# Patient Record
Sex: MALE | Race: WHITE | NOT HISPANIC OR LATINO | Employment: FULL TIME | URBAN - METROPOLITAN AREA
[De-identification: names, ages, dates, MRNs, and addresses within clinical notes are randomized per-mention and may not be internally consistent; named-entity substitution may affect disease eponyms.]

---

## 2017-04-01 ENCOUNTER — HOSPITAL ENCOUNTER (OUTPATIENT)
Facility: HOSPITAL | Age: 52
Setting detail: OBSERVATION
Discharge: HOME | End: 2017-04-03
Attending: EMERGENCY MEDICINE | Admitting: INTERNAL MEDICINE

## 2017-04-01 DIAGNOSIS — F10.239 ALCOHOL WITHDRAWAL (HCC): Primary | ICD-10-CM

## 2017-04-01 DIAGNOSIS — E87.1 HYPONATREMIA: ICD-10-CM

## 2017-04-01 DIAGNOSIS — F19.11 HISTORY OF DRUG ABUSE (HCC): ICD-10-CM

## 2017-04-01 DIAGNOSIS — R25.1 TREMOR: ICD-10-CM

## 2017-04-01 LAB
ALBUMIN SERPL BCP-MCNC: 3.9 G/DL (ref 3.5–5)
ALP SERPL-CCNC: 65 U/L (ref 46–116)
ALT SERPL W P-5'-P-CCNC: 146 U/L (ref 12–78)
AMPHETAMINES SERPL QL SCN: NEGATIVE
ANION GAP SERPL CALCULATED.3IONS-SCNC: 12 MMOL/L (ref 4–13)
APTT PPP: 25 SECONDS (ref 24–36)
AST SERPL W P-5'-P-CCNC: 104 U/L (ref 5–45)
BARBITURATES UR QL: NEGATIVE
BASOPHILS # BLD AUTO: 0.04 THOUSANDS/ΜL (ref 0–0.1)
BASOPHILS NFR BLD AUTO: 1 % (ref 0–1)
BENZODIAZ UR QL: NEGATIVE
BILIRUB SERPL-MCNC: 0.59 MG/DL (ref 0.2–1)
BUN SERPL-MCNC: 10 MG/DL (ref 5–25)
CALCIUM SERPL-MCNC: 9.1 MG/DL (ref 8.3–10.1)
CHLORIDE SERPL-SCNC: 96 MMOL/L (ref 100–108)
CO2 SERPL-SCNC: 24 MMOL/L (ref 21–32)
COCAINE UR QL: POSITIVE
CREAT SERPL-MCNC: 0.78 MG/DL (ref 0.6–1.3)
EOSINOPHIL # BLD AUTO: 0.02 THOUSAND/ΜL (ref 0–0.61)
EOSINOPHIL NFR BLD AUTO: 0 % (ref 0–6)
ERYTHROCYTE [DISTWIDTH] IN BLOOD BY AUTOMATED COUNT: 14 % (ref 11.6–15.1)
ETHANOL EXG-MCNC: 0 MG/DL
GFR SERPL CREATININE-BSD FRML MDRD: >60 ML/MIN/1.73SQ M
GLUCOSE SERPL-MCNC: 81 MG/DL (ref 65–140)
HCT VFR BLD AUTO: 46.7 % (ref 36.5–49.3)
HGB BLD-MCNC: 16.7 G/DL (ref 12–17)
INR PPP: 0.91 (ref 0.86–1.16)
LYMPHOCYTES # BLD AUTO: 1.33 THOUSANDS/ΜL (ref 0.6–4.47)
LYMPHOCYTES NFR BLD AUTO: 18 % (ref 14–44)
MCH RBC QN AUTO: 34.2 PG (ref 26.8–34.3)
MCHC RBC AUTO-ENTMCNC: 35.8 G/DL (ref 31.4–37.4)
MCV RBC AUTO: 96 FL (ref 82–98)
METHADONE UR QL: NEGATIVE
MONOCYTES # BLD AUTO: 0.75 THOUSAND/ΜL (ref 0.17–1.22)
MONOCYTES NFR BLD AUTO: 10 % (ref 4–12)
NEUTROPHILS # BLD AUTO: 5.17 THOUSANDS/ΜL (ref 1.85–7.62)
NEUTS SEG NFR BLD AUTO: 71 % (ref 43–75)
NRBC BLD AUTO-RTO: 0 /100 WBCS
OPIATES UR QL SCN: NEGATIVE
PCP UR QL: NEGATIVE
PLATELET # BLD AUTO: 141 THOUSANDS/UL (ref 149–390)
PMV BLD AUTO: 10.8 FL (ref 8.9–12.7)
POTASSIUM SERPL-SCNC: 3.8 MMOL/L (ref 3.5–5.3)
PROT SERPL-MCNC: 8.6 G/DL (ref 6.4–8.2)
PROTHROMBIN TIME: 12.4 SECONDS (ref 12–14.3)
RBC # BLD AUTO: 4.88 MILLION/UL (ref 3.88–5.62)
SODIUM SERPL-SCNC: 132 MMOL/L (ref 136–145)
SPECIMEN SOURCE: NORMAL
THC UR QL: POSITIVE
TROPONIN I BLD-MCNC: 0.01 NG/ML (ref 0–0.08)
WBC # BLD AUTO: 7.32 THOUSAND/UL (ref 4.31–10.16)

## 2017-04-01 PROCEDURE — 82075 ASSAY OF BREATH ETHANOL: CPT | Performed by: EMERGENCY MEDICINE

## 2017-04-01 PROCEDURE — 84484 ASSAY OF TROPONIN QUANT: CPT

## 2017-04-01 PROCEDURE — 80053 COMPREHEN METABOLIC PANEL: CPT | Performed by: EMERGENCY MEDICINE

## 2017-04-01 PROCEDURE — 85025 COMPLETE CBC W/AUTO DIFF WBC: CPT | Performed by: EMERGENCY MEDICINE

## 2017-04-01 PROCEDURE — 96374 THER/PROPH/DIAG INJ IV PUSH: CPT

## 2017-04-01 PROCEDURE — 36415 COLL VENOUS BLD VENIPUNCTURE: CPT | Performed by: EMERGENCY MEDICINE

## 2017-04-01 PROCEDURE — 85610 PROTHROMBIN TIME: CPT | Performed by: EMERGENCY MEDICINE

## 2017-04-01 PROCEDURE — 96361 HYDRATE IV INFUSION ADD-ON: CPT

## 2017-04-01 PROCEDURE — 85730 THROMBOPLASTIN TIME PARTIAL: CPT | Performed by: EMERGENCY MEDICINE

## 2017-04-01 PROCEDURE — 80307 DRUG TEST PRSMV CHEM ANLYZR: CPT | Performed by: EMERGENCY MEDICINE

## 2017-04-01 PROCEDURE — 93005 ELECTROCARDIOGRAM TRACING: CPT | Performed by: EMERGENCY MEDICINE

## 2017-04-01 RX ORDER — LORAZEPAM 0.5 MG/1
1 TABLET ORAL ONCE
Status: COMPLETED | OUTPATIENT
Start: 2017-04-01 | End: 2017-04-01

## 2017-04-01 RX ORDER — LORAZEPAM 2 MG/ML
1 INJECTION INTRAMUSCULAR ONCE
Status: COMPLETED | OUTPATIENT
Start: 2017-04-01 | End: 2017-04-01

## 2017-04-01 RX ADMIN — LORAZEPAM 1 MG: 2 INJECTION, SOLUTION INTRAMUSCULAR; INTRAVENOUS at 21:40

## 2017-04-01 RX ADMIN — SODIUM CHLORIDE 1000 ML: 0.9 INJECTION, SOLUTION INTRAVENOUS at 21:39

## 2017-04-01 RX ADMIN — LORAZEPAM 1 MG: 0.5 TABLET ORAL at 21:04

## 2017-04-02 PROBLEM — R74.01 TRANSAMINITIS: Status: ACTIVE | Noted: 2017-04-02

## 2017-04-02 PROBLEM — E87.1 HYPONATREMIA: Status: ACTIVE | Noted: 2017-04-02

## 2017-04-02 PROBLEM — F10.239 ALCOHOL WITHDRAWAL (HCC): Status: ACTIVE | Noted: 2017-04-02

## 2017-04-02 PROBLEM — F10.939 ALCOHOL WITHDRAWAL (HCC): Status: ACTIVE | Noted: 2017-04-02

## 2017-04-02 PROBLEM — F17.200 NICOTINE DEPENDENCE: Status: ACTIVE | Noted: 2017-04-02

## 2017-04-02 PROBLEM — F10.10 ALCOHOL ABUSE: Status: ACTIVE | Noted: 2017-04-02

## 2017-04-02 PROBLEM — F14.10 COCAINE ABUSE (HCC): Status: ACTIVE | Noted: 2017-04-02

## 2017-04-02 PROBLEM — F12.10 MARIJUANA ABUSE: Status: ACTIVE | Noted: 2017-04-02

## 2017-04-02 LAB
ANION GAP SERPL CALCULATED.3IONS-SCNC: 7 MMOL/L (ref 4–13)
APAP SERPL-MCNC: <2 UG/ML (ref 10–30)
ATRIAL RATE: 77 BPM
BUN SERPL-MCNC: 13 MG/DL (ref 5–25)
CALCIUM SERPL-MCNC: 8.8 MG/DL (ref 8.3–10.1)
CHLORIDE SERPL-SCNC: 102 MMOL/L (ref 100–108)
CO2 SERPL-SCNC: 26 MMOL/L (ref 21–32)
CREAT SERPL-MCNC: 0.81 MG/DL (ref 0.6–1.3)
GFR SERPL CREATININE-BSD FRML MDRD: >60 ML/MIN/1.73SQ M
GLUCOSE SERPL-MCNC: 67 MG/DL (ref 65–140)
MAGNESIUM SERPL-MCNC: 2.3 MG/DL (ref 1.6–2.6)
P AXIS: 62 DEGREES
POTASSIUM SERPL-SCNC: 3.6 MMOL/L (ref 3.5–5.3)
PR INTERVAL: 138 MS
QRS AXIS: 67 DEGREES
QRSD INTERVAL: 90 MS
QT INTERVAL: 412 MS
QTC INTERVAL: 466 MS
SODIUM SERPL-SCNC: 135 MMOL/L (ref 136–145)
T WAVE AXIS: 39 DEGREES
TSH SERPL DL<=0.05 MIU/L-ACNC: 3.44 UIU/ML (ref 0.36–3.74)
VENTRICULAR RATE: 77 BPM

## 2017-04-02 PROCEDURE — 84443 ASSAY THYROID STIM HORMONE: CPT | Performed by: INTERNAL MEDICINE

## 2017-04-02 PROCEDURE — 83735 ASSAY OF MAGNESIUM: CPT | Performed by: INTERNAL MEDICINE

## 2017-04-02 PROCEDURE — 80329 ANALGESICS NON-OPIOID 1 OR 2: CPT | Performed by: INTERNAL MEDICINE

## 2017-04-02 PROCEDURE — 99285 EMERGENCY DEPT VISIT HI MDM: CPT

## 2017-04-02 PROCEDURE — 80048 BASIC METABOLIC PNL TOTAL CA: CPT | Performed by: INTERNAL MEDICINE

## 2017-04-02 RX ORDER — THIAMINE MONONITRATE (VIT B1) 100 MG
100 TABLET ORAL DAILY
Status: DISCONTINUED | OUTPATIENT
Start: 2017-04-02 | End: 2017-04-03 | Stop reason: HOSPADM

## 2017-04-02 RX ORDER — SODIUM CHLORIDE 9 MG/ML
75 INJECTION, SOLUTION INTRAVENOUS CONTINUOUS
Status: DISCONTINUED | OUTPATIENT
Start: 2017-04-02 | End: 2017-04-03

## 2017-04-02 RX ORDER — ONDANSETRON 2 MG/ML
4 INJECTION INTRAMUSCULAR; INTRAVENOUS EVERY 6 HOURS PRN
Status: DISCONTINUED | OUTPATIENT
Start: 2017-04-02 | End: 2017-04-03 | Stop reason: HOSPADM

## 2017-04-02 RX ORDER — NICOTINE 21 MG/24HR
1 PATCH, TRANSDERMAL 24 HOURS TRANSDERMAL DAILY
Status: DISCONTINUED | OUTPATIENT
Start: 2017-04-02 | End: 2017-04-03 | Stop reason: HOSPADM

## 2017-04-02 RX ORDER — LORAZEPAM 2 MG/ML
1 INJECTION INTRAMUSCULAR EVERY 4 HOURS PRN
Status: DISCONTINUED | OUTPATIENT
Start: 2017-04-02 | End: 2017-04-03 | Stop reason: HOSPADM

## 2017-04-02 RX ORDER — HEPARIN SODIUM 5000 [USP'U]/ML
5000 INJECTION, SOLUTION INTRAVENOUS; SUBCUTANEOUS EVERY 8 HOURS SCHEDULED
Status: DISCONTINUED | OUTPATIENT
Start: 2017-04-02 | End: 2017-04-02

## 2017-04-02 RX ORDER — FOLIC ACID 1 MG/1
1 TABLET ORAL DAILY
Status: DISCONTINUED | OUTPATIENT
Start: 2017-04-02 | End: 2017-04-03 | Stop reason: HOSPADM

## 2017-04-02 RX ADMIN — NICOTINE 1 PATCH: 21 PATCH, EXTENDED RELEASE TRANSDERMAL at 09:20

## 2017-04-02 RX ADMIN — SODIUM CHLORIDE 75 ML/HR: 0.9 INJECTION, SOLUTION INTRAVENOUS at 02:52

## 2017-04-02 RX ADMIN — ENOXAPARIN SODIUM 40 MG: 40 INJECTION SUBCUTANEOUS at 09:22

## 2017-04-02 RX ADMIN — Medication 100 MG: at 02:52

## 2017-04-02 RX ADMIN — LORAZEPAM 1 MG: 2 INJECTION, SOLUTION INTRAMUSCULAR; INTRAVENOUS at 14:27

## 2017-04-02 RX ADMIN — LORAZEPAM 1 MG: 2 INJECTION, SOLUTION INTRAMUSCULAR; INTRAVENOUS at 18:34

## 2017-04-02 RX ADMIN — LORAZEPAM 1 MG: 2 INJECTION, SOLUTION INTRAMUSCULAR; INTRAVENOUS at 09:18

## 2017-04-02 RX ADMIN — LORAZEPAM 1 MG: 2 INJECTION, SOLUTION INTRAMUSCULAR; INTRAVENOUS at 02:52

## 2017-04-02 RX ADMIN — FOLIC ACID 1 MG: 1 TABLET ORAL at 02:52

## 2017-04-02 RX ADMIN — SODIUM CHLORIDE 75 ML/HR: 0.9 INJECTION, SOLUTION INTRAVENOUS at 14:24

## 2017-04-03 VITALS
RESPIRATION RATE: 20 BRPM | DIASTOLIC BLOOD PRESSURE: 85 MMHG | BODY MASS INDEX: 23.18 KG/M2 | TEMPERATURE: 97.9 F | OXYGEN SATURATION: 92 % | WEIGHT: 156.53 LBS | HEIGHT: 69 IN | SYSTOLIC BLOOD PRESSURE: 150 MMHG | HEART RATE: 100 BPM

## 2017-04-03 PROBLEM — E87.1 HYPONATREMIA: Status: RESOLVED | Noted: 2017-04-02 | Resolved: 2017-04-03

## 2017-04-03 LAB
ANION GAP SERPL CALCULATED.3IONS-SCNC: 8 MMOL/L (ref 4–13)
BUN SERPL-MCNC: 12 MG/DL (ref 5–25)
CALCIUM SERPL-MCNC: 8.7 MG/DL (ref 8.3–10.1)
CHLORIDE SERPL-SCNC: 106 MMOL/L (ref 100–108)
CO2 SERPL-SCNC: 24 MMOL/L (ref 21–32)
CREAT SERPL-MCNC: 0.78 MG/DL (ref 0.6–1.3)
ERYTHROCYTE [DISTWIDTH] IN BLOOD BY AUTOMATED COUNT: 13.7 % (ref 11.6–15.1)
GFR SERPL CREATININE-BSD FRML MDRD: >60 ML/MIN/1.73SQ M
GLUCOSE SERPL-MCNC: 86 MG/DL (ref 65–140)
HCT VFR BLD AUTO: 47.3 % (ref 36.5–49.3)
HGB BLD-MCNC: 16.4 G/DL (ref 12–17)
MCH RBC QN AUTO: 33.3 PG (ref 26.8–34.3)
MCHC RBC AUTO-ENTMCNC: 34.7 G/DL (ref 31.4–37.4)
MCV RBC AUTO: 96 FL (ref 82–98)
PLATELET # BLD AUTO: 124 THOUSANDS/UL (ref 149–390)
PMV BLD AUTO: 10.8 FL (ref 8.9–12.7)
POTASSIUM SERPL-SCNC: 3.5 MMOL/L (ref 3.5–5.3)
RBC # BLD AUTO: 4.92 MILLION/UL (ref 3.88–5.62)
SODIUM SERPL-SCNC: 138 MMOL/L (ref 136–145)
WBC # BLD AUTO: 6.73 THOUSAND/UL (ref 4.31–10.16)

## 2017-04-03 PROCEDURE — 86705 HEP B CORE ANTIBODY IGM: CPT | Performed by: INTERNAL MEDICINE

## 2017-04-03 PROCEDURE — 86803 HEPATITIS C AB TEST: CPT | Performed by: INTERNAL MEDICINE

## 2017-04-03 PROCEDURE — 85027 COMPLETE CBC AUTOMATED: CPT | Performed by: INTERNAL MEDICINE

## 2017-04-03 PROCEDURE — 87340 HEPATITIS B SURFACE AG IA: CPT | Performed by: INTERNAL MEDICINE

## 2017-04-03 PROCEDURE — 80048 BASIC METABOLIC PNL TOTAL CA: CPT | Performed by: INTERNAL MEDICINE

## 2017-04-03 PROCEDURE — 86704 HEP B CORE ANTIBODY TOTAL: CPT | Performed by: INTERNAL MEDICINE

## 2017-04-03 RX ORDER — FOLIC ACID 1 MG/1
1 TABLET ORAL DAILY
Qty: 30 TABLET | Refills: 0 | Status: SHIPPED | OUTPATIENT
Start: 2017-04-03 | End: 2017-05-03

## 2017-04-03 RX ORDER — NICOTINE 21 MG/24HR
1 PATCH, TRANSDERMAL 24 HOURS TRANSDERMAL DAILY
Qty: 30 PATCH | Refills: 0 | Status: SHIPPED | OUTPATIENT
Start: 2017-04-03 | End: 2017-05-03

## 2017-04-03 RX ADMIN — FOLIC ACID 1 MG: 1 TABLET ORAL at 08:21

## 2017-04-03 RX ADMIN — ENOXAPARIN SODIUM 40 MG: 40 INJECTION SUBCUTANEOUS at 08:22

## 2017-04-03 RX ADMIN — SODIUM CHLORIDE 75 ML/HR: 0.9 INJECTION, SOLUTION INTRAVENOUS at 03:50

## 2017-04-03 RX ADMIN — LORAZEPAM 1 MG: 2 INJECTION, SOLUTION INTRAMUSCULAR; INTRAVENOUS at 00:39

## 2017-04-03 RX ADMIN — Medication 100 MG: at 08:21

## 2017-04-03 RX ADMIN — NICOTINE 1 PATCH: 21 PATCH, EXTENDED RELEASE TRANSDERMAL at 08:22

## 2017-04-03 RX ADMIN — LORAZEPAM 1 MG: 2 INJECTION, SOLUTION INTRAMUSCULAR; INTRAVENOUS at 11:41

## 2017-04-04 LAB
HBV CORE AB SER QL: REACTIVE
HBV CORE IGM SER QL: ABNORMAL
HBV SURFACE AG SER QL: ABNORMAL
HCV AB SER QL: ABNORMAL

## 2017-04-14 ENCOUNTER — GENERIC CONVERSION - ENCOUNTER (OUTPATIENT)
Dept: OTHER | Facility: OTHER | Age: 52
End: 2017-04-14

## 2017-04-18 ENCOUNTER — TELEPHONE (OUTPATIENT)
Dept: OTHER | Facility: HOSPITAL | Age: 52
End: 2017-04-18

## 2017-04-18 ENCOUNTER — GENERIC CONVERSION - ENCOUNTER (OUTPATIENT)
Dept: OTHER | Facility: OTHER | Age: 52
End: 2017-04-18

## 2018-01-18 NOTE — PROGRESS NOTES
Alerted of pt's chronic hepatitis panel results  Attempted to call patient three times on home phone  Every time reached unidentified voicemail  No other numbers or email or contact information listed for patient  Electronically signed by:Elsa STEVENS  Apr 18 2017 11:34AM EST        Electronically signed by:Elsa STEVENS  Apr 18 2017  1:21PM EST      Electronically  signed by:Elsa STEVENS    Apr 18 2017  1:27PM EST Co-author         Electronically signed by:Farshad Beyer DO  Apr 18 2017  3:54PM EST Review

## 2019-02-11 ENCOUNTER — TRANSCRIBE ORDERS (OUTPATIENT)
Dept: ADMINISTRATIVE | Facility: HOSPITAL | Age: 54
End: 2019-02-11

## 2019-02-11 DIAGNOSIS — J40 BRONCHITIS: Primary | ICD-10-CM

## 2021-10-26 ENCOUNTER — APPOINTMENT (EMERGENCY)
Dept: RADIOLOGY | Facility: HOSPITAL | Age: 56
End: 2021-10-26
Payer: COMMERCIAL

## 2021-10-26 ENCOUNTER — HOSPITAL ENCOUNTER (EMERGENCY)
Facility: HOSPITAL | Age: 56
Discharge: HOME | End: 2021-10-26
Attending: EMERGENCY MEDICINE
Payer: COMMERCIAL

## 2021-10-26 ENCOUNTER — OCCMED (OUTPATIENT)
Dept: URGENT CARE | Facility: MEDICAL CENTER | Age: 56
End: 2021-10-26
Payer: COMMERCIAL

## 2021-10-26 VITALS
DIASTOLIC BLOOD PRESSURE: 70 MMHG | OXYGEN SATURATION: 98 % | SYSTOLIC BLOOD PRESSURE: 151 MMHG | HEART RATE: 76 BPM | TEMPERATURE: 97.5 F | RESPIRATION RATE: 17 BRPM

## 2021-10-26 DIAGNOSIS — S61.012A THUMB LACERATION, LEFT, INITIAL ENCOUNTER: Primary | ICD-10-CM

## 2021-10-26 DIAGNOSIS — Z23 NEED FOR DTAP VACCINE: Primary | ICD-10-CM

## 2021-10-26 DIAGNOSIS — S62.502B OPEN FRACTURE OF LEFT THUMB: ICD-10-CM

## 2021-10-26 PROCEDURE — 13131 CMPLX RPR F/C/C/M/N/AX/G/H/F: CPT | Performed by: PHYSICIAN ASSISTANT

## 2021-10-26 PROCEDURE — 99213 OFFICE O/P EST LOW 20 MIN: CPT

## 2021-10-26 PROCEDURE — 99283 EMERGENCY DEPT VISIT LOW MDM: CPT | Performed by: PHYSICIAN ASSISTANT

## 2021-10-26 PROCEDURE — 73140 X-RAY EXAM OF FINGER(S): CPT

## 2021-10-26 PROCEDURE — 96365 THER/PROPH/DIAG IV INF INIT: CPT

## 2021-10-26 PROCEDURE — 90715 TDAP VACCINE 7 YRS/> IM: CPT

## 2021-10-26 PROCEDURE — 99283 EMERGENCY DEPT VISIT LOW MDM: CPT

## 2021-10-26 PROCEDURE — 90471 IMMUNIZATION ADMIN: CPT

## 2021-10-26 RX ORDER — LIDOCAINE HYDROCHLORIDE AND EPINEPHRINE 10; 10 MG/ML; UG/ML
20 INJECTION, SOLUTION INFILTRATION; PERINEURAL ONCE
Status: COMPLETED | OUTPATIENT
Start: 2021-10-26 | End: 2021-10-26

## 2021-10-26 RX ORDER — CEPHALEXIN 500 MG/1
500 CAPSULE ORAL EVERY 6 HOURS SCHEDULED
Qty: 40 CAPSULE | Refills: 0 | Status: SHIPPED | OUTPATIENT
Start: 2021-10-26 | End: 2021-11-05

## 2021-10-26 RX ORDER — CEFAZOLIN SODIUM 2 G/50ML
2000 SOLUTION INTRAVENOUS ONCE
Status: COMPLETED | OUTPATIENT
Start: 2021-10-26 | End: 2021-10-26

## 2021-10-26 RX ADMIN — LIDOCAINE HYDROCHLORIDE,EPINEPHRINE BITARTRATE 20 ML: 10; .01 INJECTION, SOLUTION INFILTRATION; PERINEURAL at 18:42

## 2021-10-26 RX ADMIN — CEFAZOLIN SODIUM 2000 MG: 2 SOLUTION INTRAVENOUS at 19:50

## 2023-12-28 ENCOUNTER — APPOINTMENT (INPATIENT)
Dept: ULTRASOUND IMAGING | Facility: HOSPITAL | Age: 58
End: 2023-12-28
Payer: COMMERCIAL

## 2023-12-28 ENCOUNTER — HOSPITAL ENCOUNTER (INPATIENT)
Facility: HOSPITAL | Age: 58
LOS: 3 days | Discharge: HOME | End: 2023-12-31
Attending: EMERGENCY MEDICINE | Admitting: EMERGENCY MEDICINE
Payer: COMMERCIAL

## 2023-12-28 DIAGNOSIS — F10.20 ALCOHOL USE DISORDER, SEVERE, DEPENDENCE (HCC): ICD-10-CM

## 2023-12-28 DIAGNOSIS — F10.10 ALCOHOL ABUSE: Primary | ICD-10-CM

## 2023-12-28 LAB
ALBUMIN SERPL BCP-MCNC: 4.8 G/DL (ref 3.5–5)
ALP SERPL-CCNC: 41 U/L (ref 34–104)
ALT SERPL W P-5'-P-CCNC: 139 U/L (ref 7–52)
AMMONIA PLAS-SCNC: 33 UMOL/L (ref 18–72)
AMPHETAMINES SERPL QL SCN: NEGATIVE
ANION GAP SERPL CALCULATED.3IONS-SCNC: 14 MMOL/L
AST SERPL W P-5'-P-CCNC: 141 U/L (ref 13–39)
ATRIAL RATE: 78 BPM
BACTERIA UR QL AUTO: ABNORMAL /HPF
BARBITURATES UR QL: POSITIVE
BASOPHILS # BLD AUTO: 0.05 THOUSANDS/ÂΜL (ref 0–0.1)
BASOPHILS NFR BLD AUTO: 1 % (ref 0–1)
BENZODIAZ UR QL: NEGATIVE
BILIRUB SERPL-MCNC: 0.73 MG/DL (ref 0.2–1)
BILIRUB UR QL STRIP: NEGATIVE
BUN SERPL-MCNC: 13 MG/DL (ref 5–25)
CALCIUM SERPL-MCNC: 9.6 MG/DL (ref 8.4–10.2)
CHLORIDE SERPL-SCNC: 98 MMOL/L (ref 96–108)
CLARITY UR: CLEAR
CO2 SERPL-SCNC: 26 MMOL/L (ref 21–32)
COCAINE UR QL: NEGATIVE
COLOR UR: ABNORMAL
CREAT SERPL-MCNC: 0.68 MG/DL (ref 0.6–1.3)
EOSINOPHIL # BLD AUTO: 0.04 THOUSAND/ÂΜL (ref 0–0.61)
EOSINOPHIL NFR BLD AUTO: 1 % (ref 0–6)
ERYTHROCYTE [DISTWIDTH] IN BLOOD BY AUTOMATED COUNT: 13.2 % (ref 11.6–15.1)
ETHANOL SERPL-MCNC: 181 MG/DL
GFR SERPL CREATININE-BSD FRML MDRD: 104 ML/MIN/1.73SQ M
GLUCOSE SERPL-MCNC: 91 MG/DL (ref 65–140)
GLUCOSE UR STRIP-MCNC: NEGATIVE MG/DL
HCT VFR BLD AUTO: 47 % (ref 36.5–49.3)
HGB BLD-MCNC: 16.5 G/DL (ref 12–17)
HGB UR QL STRIP.AUTO: NEGATIVE
IMM GRANULOCYTES # BLD AUTO: 0.01 THOUSAND/UL (ref 0–0.2)
IMM GRANULOCYTES NFR BLD AUTO: 0 % (ref 0–2)
KETONES UR STRIP-MCNC: ABNORMAL MG/DL
LEUKOCYTE ESTERASE UR QL STRIP: NEGATIVE
LIPASE SERPL-CCNC: 35 U/L (ref 11–82)
LYMPHOCYTES # BLD AUTO: 1.17 THOUSANDS/ÂΜL (ref 0.6–4.47)
LYMPHOCYTES NFR BLD AUTO: 26 % (ref 14–44)
MAGNESIUM SERPL-MCNC: 2 MG/DL (ref 1.9–2.7)
MCH RBC QN AUTO: 32.5 PG (ref 26.8–34.3)
MCHC RBC AUTO-ENTMCNC: 35.1 G/DL (ref 31.4–37.4)
MCV RBC AUTO: 93 FL (ref 82–98)
METHADONE UR QL: NEGATIVE
MONOCYTES # BLD AUTO: 0.41 THOUSAND/ÂΜL (ref 0.17–1.22)
MONOCYTES NFR BLD AUTO: 9 % (ref 4–12)
NEUTROPHILS # BLD AUTO: 2.82 THOUSANDS/ÂΜL (ref 1.85–7.62)
NEUTS SEG NFR BLD AUTO: 63 % (ref 43–75)
NITRITE UR QL STRIP: NEGATIVE
NON-SQ EPI CELLS URNS QL MICRO: ABNORMAL /HPF
NRBC BLD AUTO-RTO: 0 /100 WBCS
OPIATES UR QL SCN: NEGATIVE
OXYCODONE+OXYMORPHONE UR QL SCN: NEGATIVE
P AXIS: 43 DEGREES
PCP UR QL: NEGATIVE
PH UR STRIP.AUTO: 7 [PH]
PLATELET # BLD AUTO: 115 THOUSANDS/UL (ref 149–390)
PMV BLD AUTO: 11.1 FL (ref 8.9–12.7)
POTASSIUM SERPL-SCNC: 3.8 MMOL/L (ref 3.5–5.3)
PR INTERVAL: 136 MS
PROT SERPL-MCNC: 7.6 G/DL (ref 6.4–8.4)
PROT UR STRIP-MCNC: ABNORMAL MG/DL
QRS AXIS: 52 DEGREES
QRSD INTERVAL: 90 MS
QT INTERVAL: 408 MS
QTC INTERVAL: 465 MS
RBC # BLD AUTO: 5.08 MILLION/UL (ref 3.88–5.62)
RBC #/AREA URNS AUTO: ABNORMAL /HPF
SODIUM SERPL-SCNC: 138 MMOL/L (ref 135–147)
SP GR UR STRIP.AUTO: 1.01 (ref 1–1.04)
T WAVE AXIS: 33 DEGREES
THC UR QL: POSITIVE
UROBILINOGEN UA: 4 MG/DL
VENTRICULAR RATE: 78 BPM
WBC # BLD AUTO: 4.5 THOUSAND/UL (ref 4.31–10.16)
WBC #/AREA URNS AUTO: ABNORMAL /HPF

## 2023-12-28 PROCEDURE — 99284 EMERGENCY DEPT VISIT MOD MDM: CPT

## 2023-12-28 PROCEDURE — 76705 ECHO EXAM OF ABDOMEN: CPT

## 2023-12-28 PROCEDURE — 85025 COMPLETE CBC W/AUTO DIFF WBC: CPT | Performed by: EMERGENCY MEDICINE

## 2023-12-28 PROCEDURE — 99291 CRITICAL CARE FIRST HOUR: CPT | Performed by: EMERGENCY MEDICINE

## 2023-12-28 PROCEDURE — 36415 COLL VENOUS BLD VENIPUNCTURE: CPT | Performed by: EMERGENCY MEDICINE

## 2023-12-28 PROCEDURE — 80307 DRUG TEST PRSMV CHEM ANLYZR: CPT

## 2023-12-28 PROCEDURE — 93005 ELECTROCARDIOGRAM TRACING: CPT

## 2023-12-28 PROCEDURE — 82077 ASSAY SPEC XCP UR&BREATH IA: CPT | Performed by: EMERGENCY MEDICINE

## 2023-12-28 PROCEDURE — 83735 ASSAY OF MAGNESIUM: CPT | Performed by: EMERGENCY MEDICINE

## 2023-12-28 PROCEDURE — 83690 ASSAY OF LIPASE: CPT | Performed by: EMERGENCY MEDICINE

## 2023-12-28 PROCEDURE — 81001 URINALYSIS AUTO W/SCOPE: CPT

## 2023-12-28 PROCEDURE — 99285 EMERGENCY DEPT VISIT HI MDM: CPT | Performed by: EMERGENCY MEDICINE

## 2023-12-28 PROCEDURE — 80053 COMPREHEN METABOLIC PANEL: CPT | Performed by: EMERGENCY MEDICINE

## 2023-12-28 PROCEDURE — 82140 ASSAY OF AMMONIA: CPT | Performed by: EMERGENCY MEDICINE

## 2023-12-28 RX ORDER — PHENOBARBITAL SODIUM 130 MG/ML
130 INJECTION INTRAMUSCULAR ONCE
Status: COMPLETED | OUTPATIENT
Start: 2023-12-28 | End: 2023-12-28

## 2023-12-28 RX ORDER — SODIUM CHLORIDE 9 MG/ML
125 INJECTION, SOLUTION INTRAVENOUS CONTINUOUS
Status: DISCONTINUED | OUTPATIENT
Start: 2023-12-28 | End: 2023-12-29

## 2023-12-28 RX ORDER — ONDANSETRON 2 MG/ML
4 INJECTION INTRAMUSCULAR; INTRAVENOUS EVERY 6 HOURS PRN
Status: DISCONTINUED | OUTPATIENT
Start: 2023-12-28 | End: 2023-12-31 | Stop reason: HOSPADM

## 2023-12-28 RX ORDER — LANOLIN ALCOHOL/MO/W.PET/CERES
6 CREAM (GRAM) TOPICAL
Status: DISCONTINUED | OUTPATIENT
Start: 2023-12-28 | End: 2023-12-31 | Stop reason: HOSPADM

## 2023-12-28 RX ORDER — ENOXAPARIN SODIUM 100 MG/ML
40 INJECTION SUBCUTANEOUS DAILY
Status: DISCONTINUED | OUTPATIENT
Start: 2023-12-28 | End: 2023-12-29

## 2023-12-28 RX ORDER — TRAZODONE HYDROCHLORIDE 50 MG/1
50 TABLET ORAL
Status: DISCONTINUED | OUTPATIENT
Start: 2023-12-28 | End: 2023-12-31 | Stop reason: HOSPADM

## 2023-12-28 RX ORDER — PHENOBARBITAL SODIUM 130 MG/ML
260 INJECTION INTRAMUSCULAR ONCE
Status: COMPLETED | OUTPATIENT
Start: 2023-12-28 | End: 2023-12-28

## 2023-12-28 RX ORDER — ACETAMINOPHEN 325 MG/1
650 TABLET ORAL EVERY 6 HOURS PRN
Status: DISCONTINUED | OUTPATIENT
Start: 2023-12-28 | End: 2023-12-31 | Stop reason: HOSPADM

## 2023-12-28 RX ADMIN — MULTIPLE VITAMINS W/ MINERALS TAB 1 TABLET: TAB ORAL at 08:40

## 2023-12-28 RX ADMIN — FOLIC ACID 1 MG: 5 INJECTION, SOLUTION INTRAMUSCULAR; INTRAVENOUS; SUBCUTANEOUS at 08:53

## 2023-12-28 RX ADMIN — SODIUM CHLORIDE 125 ML/HR: 0.9 INJECTION, SOLUTION INTRAVENOUS at 12:38

## 2023-12-28 RX ADMIN — SODIUM CHLORIDE 125 ML/HR: 0.9 INJECTION, SOLUTION INTRAVENOUS at 20:40

## 2023-12-28 RX ADMIN — PHENOBARBITAL SODIUM 130 MG: 130 INJECTION INTRAMUSCULAR; INTRAVENOUS at 20:40

## 2023-12-28 RX ADMIN — TRAZODONE HYDROCHLORIDE 50 MG: 50 TABLET ORAL at 23:08

## 2023-12-28 RX ADMIN — ENOXAPARIN SODIUM 40 MG: 100 INJECTION SUBCUTANEOUS at 10:22

## 2023-12-28 RX ADMIN — PHENOBARBITAL SODIUM 130 MG: 130 INJECTION INTRAMUSCULAR; INTRAVENOUS at 12:38

## 2023-12-28 RX ADMIN — THIAMINE HYDROCHLORIDE 500 MG: 100 INJECTION, SOLUTION INTRAMUSCULAR; INTRAVENOUS at 22:55

## 2023-12-28 RX ADMIN — PHENOBARBITAL SODIUM 130 MG: 130 INJECTION INTRAMUSCULAR; INTRAVENOUS at 17:49

## 2023-12-28 RX ADMIN — PHENOBARBITAL SODIUM 260 MG: 130 INJECTION INTRAMUSCULAR; INTRAVENOUS at 10:14

## 2023-12-28 RX ADMIN — MELATONIN TAB 3 MG 6 MG: 3 TAB at 23:08

## 2023-12-28 RX ADMIN — THIAMINE HYDROCHLORIDE 100 MG: 100 INJECTION, SOLUTION INTRAMUSCULAR; INTRAVENOUS at 08:53

## 2023-12-28 RX ADMIN — SODIUM CHLORIDE 125 ML/HR: 0.9 INJECTION, SOLUTION INTRAVENOUS at 08:42

## 2023-12-28 RX ADMIN — THIAMINE HYDROCHLORIDE 500 MG: 100 INJECTION, SOLUTION INTRAMUSCULAR; INTRAVENOUS at 10:56

## 2023-12-28 NOTE — H&P
"HISTORY & PHYSICAL EXAM  DEPARTMENT OF MEDICAL TOXICOLOGY  LEVEL 4 MEDICAL DETOX UNIT  Steve Dunham 58 y.o. male MRN: [id number]  Unit/Bed#:  DETOX 511-01 Encounter: [id number]      Reason for Admission/Principal Problem: Ethanol withdrawal, Ethanol use disorder  Admitting Provider: Mk Mcdowell MD  Attending Provider: Opal Murcia*   12/28/2023  7:39 AM        Alcohol withdrawal syndrome with complication (HCC)  Assessment & Plan  Patient reports 6-9 months of chronic alcohol use. 1 750ml bottle of vodka + several beers daily. Reports drinking a \"large glass of vodka\" at 0230 12/28/23. Presented to ED at the recommendation of wife and AA peer. Presentation to ED with ethanol of 181 at 0800. Received multivitamin, thiamine, folic acid in the ED. Symptoms upon admission to unit include moderate anxiety, mild agitation, confusion, diaphoresis, mild tremulousness.  Initiate SEWS protocol.  Thiamine 500mg IV Q8H, Folic acid 1mg IV QD, Multivitamin PO QD, and IVF.    Alcohol use disorder, severe, dependence (HCC)  Assessment & Plan  Patient endorses history of alcohol abuse starting from childhood. Endorses occasional cocaine and THC use throughout this time. Became sober in 2017. Detox and 90-day inpatient rehab treatment, followed by sober living residence before returning home. Has been committed to daily AA meetings and active engagement in recovery for the past 5 years. Stopped attending peer recovery meetings and relapsed on alcohol 9 months ago. Has been consuming 750ml of vodka and 4-5 beers daily for the past 6 months. Poor nutrition during this time. Family reports notable confusion lately (intoxication VS wernicke), he notices mild/moderate unsteadiness over past several months as well as numbness in legs and fingertips. Ataxia present on exam. No ophthalmoplegia noted on exam. Not interested in naltrexone as medication was intolerable to patient during previous inpatient " D/A treatment. Patient unsure on disposition. Recovery support system at home with history of sustained sobriety.  Alcohol withdrawal management as noted above, including nutritional therapy.  Symptoms of wernicke (ataxia, confusion) and alcoholic polyneuropathy (numbness in UE and LE) to be monitored.  Patient to work with CM to determine appropriate aftercare.    Transaminitis  Assessment & Plan  Lab Results   Component Value Date     (H) 12/28/2023     (H) 12/28/2023    ALKPHOS 41 12/28/2023    TBILI 0.73 12/28/2023        Elevated LFTs on admission, likely 2/2 chronic alcohol use/alcoholic liver disease. History of chronic Hep C, s/p 8 week treatment of Mavyrick with undetectable viral level at 4 weeks. Did not complete recommended 12, 24 week level, but without cirrhosis on U/S at that time. Pt endorses abdominal pain, and has moderate tenderness upon palpation of the RUQ. RUQ tenderness started several months ago. No IV drug or cocaine use during past year.  IVF  CMP QD  RUQ U/S  Repeat Hepatitis panel  Encourage alcohol cessation     Nicotine dependence  Assessment & Plan  I personally provided tobacco cessation education greater than 7 minutes. Patient currently smokes 2 PPD.  NRT               VTE Prophylaxis: Enoxaparin (Lovenox)  / sequential compression device   Code Status: Full Code      Anticipated Length of Stay:  Patient will be admitted on an Inpatient basis with an anticipated length of stay of  3  midnights.   Justification for Hospital Stay: Alcohol Detox    For any questions or concerns, please Tiger Text the advanced practitioner in the role of [facility]-DETOX-AP On Call      This patient qualifies for Level IV medically managed intensive inpatient services under the criteria set by the American Society of Addiction Medicine, including dimensions 1-3. The patient is in withdrawal (or is intoxicated with high risk of withdrawal), with severe and unstable medical and/or psychiatric  (dual diagnosis) problems, requiring requires 24-hour medical and nursing care and the full resources of a [facility] hospital.          SEWS PHENOBARBITAL PROTOCOL FOR ALCOHOL WITHDRAWAL    Admit patient to medical detox unit and continue supportive care and stabilization of acute ethanol withdrawal per medical toxicology/detox treatment pathway. Monitor ethanol withdrawal severity via the Severity of Ethanol Withdrawal Scale (SEWS) Q4 hours and then hourly if/when SEWS > 6. Treat withdrawal per pathway and reassess Q30-60 minutes.           Mild SEWS Score 1-6  Administer medications* (IV or PO; PO preferred):  If initial SEWS score: diazepam 10mg PO/IV x 1 AND phenobarbital 65 mg PO/IV x 1  If repeat SEWS score 1-6: phenobarbital 65 mg PO/IV q1 hour x 5 doses maximum   Reassessment:   SEWS q1 hour after each dose until SEWS 0 x 2 hours  VS q1 hours (until SEWS 0, then q4 hours)  Notify provider for bedside evaluation if 5-dose maximum is reached, RASS of -3 to -5, or SEWS score escalates to moderate or severe.   Moderate SEWS Score 7-12  Administer medications* (IV):  If initial SEWS score: diazepam 10mg IV x 1 AND phenobarbital 260 mg IV x 1  If repeat SEWS score 7-12 or score escalated from mild: phenobarbital 130 mg IV q30 minutes x 5 doses maximum   Reassessment:  SEWS q30 minutes after each dose until SEWS < 7 (then hourly until SEWS 0 x 2 hours)  VS q30 minutes until SEWS < 7 (then hourly until SEWS 0, then q4 hours)  Notify provider for bedside evaluation if 5-dose maximum is reached, RASS of -3 to -5, or SEWS score escalates to severe.   Severe SEWS Score ? 13  Administer medications* (IV):  If initial SEWS score: Diazepam 10 mg IV x 1 AND phenobarbital 650 mg IV piggyback x 1 over 15-30 minutes  If repeat SEWS score ? 13 or score escalated from mild or moderate: phenobarbital 130 mg IV q30 minutes x 5 doses maximum   Reassessment:  SEWS q30 minutes after each dose until SEWS < 7 (then hourly until SEWS 0 x  2 hours)   VS q30 minutes until SEWS < 7 (then hourly until SEWS 0, then q4 hours)  Notify provider for bedside evaluation if 5-dose maximum is reached or RASS of -3 to -5   *Hold medications and notify provider if CNS depression, respirations < 10/min, or RASS of -3 to -5.         Medications to be administered adjunctively if more than 2 grams of phenobarbital is needed for stabilization of withdrawal; require attending approval.   Dexmedetomidine infusion 0.1-1mcg/kg/hr IV infusion, titratable to reduced agitation (Goal: RASS -2)  Ketamine   Acute agitated delirium: 1-2 mg/kg IV or 4-5 mg/kg IM  Refractory withdrawal: 0.1-1mg/kg/hr IV infusion, titratable to reduced agitation (Goal: RASS -2)    Further evaluation, screening and treatment:  Evaluate complete metabolic panel, transaminases, INR, and lipase. Assess hepatic ultrasound for any sign of alcoholic liver disease or cirrhosis, and ultimately refer for further hepatic evaluation and care as/if indicated.    Additional medications for ethanol associated malnutrition:  Thiamine 100 mg IV daily, increase to 500 mg TID for signs/symptoms of Wernicke's Encephalopathy or Wernicke Korsakoff Syndrome   Folic acid 1 mg IV daily   Multivitamin PO daily      Will offer first monthly injection of Naltrexone 380 mg IM, once patient is stabilized, as it has been shown to assist in decreasing cravings for ethanol.     Evaluate and treat for coexisting substance use, such as opioids and nicotine. Discuss risk factors for infectious disease, such as history of intravenous drug abuse, and offer hepatitis and HIV screening if indicated.     Case management consultation to assist with coordination of subsequent treatment after discharge.          Hx and PE limited by: no    HPI: Steve Dunham is a 58 y.o. year old male who presents with alcohol withdrawal seeking detoxification. Patient initially presented to the 12/28/23 ED requesting alcohol detox with sx of anxiety,  confusion, mild agitation, tremulousness, and diaphoresis and was subsequently transferred to the [facility] medical detox unit for medical management of alcohol withdrawal. Pt reports drinking 750ml vodka and 4-5 beers daily, and the last drink was 12/28/23 at 0200. Serum alcohol was 181 in the ED. Steve reports current alcohol withdrawal sx of anxiety, confusion, mild agtiation, tremoulsness, and diaphoresis. Patient denies history of withdrawal seizures. Denies chronic use of other substances. Admits to a history of chronic alcohol use since age 12 and 1 periods of sobriety lasting 5 years starting in 2017. Relapsed 9 months ago. Reports a past AUD treatment history consisting of inpatient rehab, recovery residence, and AA. Patient has a history of attempted naltrexone therapy and did not tolerate. Patient unsure of desired aftercare treatment at this time.    Preferred alcoholic beverage(s): Vodka, beer  Quantity and frequency of alcohol intake: Daily, 4-5 12oz beers and 750ml of vodka.  Use of any ethanol substitutes (toxic alcohols): no  Date/Time of last alcohol intake: 12/28/23 0200  Current signs and symptoms of ethanol withdrawal: anxiety, insomnia, diaphoresis, and tachycardia    SEWS Total Score: 11 (12/28/2023 10:03 AM)        Ethanol Withdrawal History  Previous ethanol withdrawal? yes  Prior inpatient treatment for ethanol withdrawal? yes  Prior outpatient treatment for ethanol withdrawal? yes  History of seizures with prior ethanol withdrawal? no  Prior treatment with naltrexone (Vivitrol)? no  Current treatment with naltrexone (Vivitrol)? no  Other current treatment for ethanol use disorder? no  Co-existing substance use? no    Review of PDMP: yes     Social History     Substance and Sexual Activity   Alcohol Use Yes    Comment: 30 pack in two days     Social History     Substance and Sexual Activity   Drug Use Not Currently    Types: Cocaine     Social History     Tobacco Use   Smoking Status Every Day     Current packs/day: 2.00    Types: Cigarettes   Smokeless Tobacco Current       Review of Systems   Constitutional:  Positive for activity change, appetite change, chills and diaphoresis.   HENT: Negative.     Eyes: Negative.    Respiratory: Negative.     Cardiovascular: Negative.    Gastrointestinal:  Positive for abdominal pain.   Endocrine: Negative.    Genitourinary: Negative.    Musculoskeletal:  Positive for back pain.   Skin: Negative.    Allergic/Immunologic: Negative.    Neurological:  Positive for numbness.   Hematological: Negative.    Psychiatric/Behavioral:  Negative for suicidal ideas. The patient is nervous/anxious.        Historical Information   Past Medical History:   Diagnosis Date    Alcoholism (HCC)     Withdrawal symptoms, alcohol (HCC)      History reviewed. No pertinent surgical history.  History reviewed. No pertinent family history.  Social History   Marital Status: /Civil Union   Patient Pre-hospital Living Situation: Stable  Patient Pre-hospital Level of Mobility: No limitations  Patient Pre-hospital Diet Restrictions: No    No Known Allergies    Prior to Admission medications    Medication Sig Start Date End Date Taking? Authorizing Provider   folic acid (FOLVITE) 1 mg tablet Take 1 tablet by mouth daily for 30 days 4/3/17 5/3/17  Elsa Ham MD   nicotine (NICODERM CQ) 21 mg/24 hr TD 24 hr patch Place 1 patch on the skin daily for 30 days 4/3/17 5/3/17  Elsa Ham MD       Current Facility-Administered Medications   Medication Dose Route Frequency    acetaminophen (TYLENOL) tablet 650 mg  650 mg Oral Q6H PRN    enoxaparin (LOVENOX) subcutaneous injection 40 mg  40 mg Subcutaneous Daily    [START ON 12/29/2023] folic acid 1 mg in sodium chloride 0.9 % 50 mL IVPB  1 mg Intravenous Daily    multivitamin-minerals (CENTRUM) tablet 1 tablet  1 tablet Oral Daily    ondansetron (ZOFRAN) injection 4 mg  4 mg Intravenous Q6H PRN    sodium chloride 0.9 % infusion  125 mL/hr  Intravenous Continuous    thiamine (VITAMIN B1) 500 mg in sodium chloride 0.9 % 50 mL IVPB  500 mg Intravenous Q8H CONSTANTINO    traZODone (DESYREL) tablet 50 mg  50 mg Oral HS PRN       Continuous Infusions:sodium chloride, 125 mL/hr, Last Rate: 125 mL/hr (12/28/23 0842)             Objective       Intake/Output Summary (Last 24 hours) at 12/28/2023 1126  Last data filed at 12/28/2023 0921  Gross per 24 hour   Intake 91.67 ml   Output --   Net 91.67 ml       Invasive Devices:   Peripheral IV 12/28/23 Left;Proximal;Ventral (anterior) Forearm (Active)       Vitals   Vitals:    12/28/23 0740 12/28/23 1000   BP: 154/95 137/73   TempSrc: Oral Tympanic   Pulse: 85 70   Resp: 20 18   Patient Position - Orthostatic VS: Lying Lying   Temp: 98.2 °F (36.8 °C) 98 °F (36.7 °C)       Physical Exam  Constitutional:       Appearance: Normal appearance. He is normal weight.   HENT:      Head: Normocephalic and atraumatic.      Right Ear: External ear normal.      Left Ear: External ear normal.      Nose: Nose normal.   Eyes:      Extraocular Movements: Extraocular movements intact.      Conjunctiva/sclera: Conjunctivae normal.   Cardiovascular:      Rate and Rhythm: Normal rate and regular rhythm.      Pulses: Normal pulses.      Heart sounds: Normal heart sounds.   Pulmonary:      Effort: Pulmonary effort is normal.      Breath sounds: Normal breath sounds.   Abdominal:      General: Bowel sounds are normal. There is no distension.      Palpations: Abdomen is soft. There is no mass.      Tenderness: There is abdominal tenderness. There is no guarding or rebound.      Hernia: No hernia is present.      Comments: RUQ tenderness on palpation   Musculoskeletal:         General: Normal range of motion.      Cervical back: Normal range of motion.   Skin:     General: Skin is warm.      Capillary Refill: Capillary refill takes less than 2 seconds.   Neurological:      General: No focal deficit present.      Comments: Ataxia present on finger to  "nose testing   Psychiatric:         Behavior: Behavior normal.      Comments: Endorses anxiety and mild agitation.           Data:    EKG, Pathology, and Other Studies: I have personally reviewed pertinent reports.    EKG: NSR, normal EKG    Lab Results:  CBC ETOH     Lab Results   Component Value Date    WBC 4.50 12/28/2023    RBC 5.08 12/28/2023    HGB 16.5 12/28/2023    HCT 47.0 12/28/2023    MCV 93 12/28/2023    MCH 32.5 12/28/2023    MCHC 35.1 12/28/2023    RDW 13.2 12/28/2023     (L) 12/28/2023    MPV 11.1 12/28/2023      No results found for: \"LACTICACID\"   CMP UA         Component Value Date/Time    K 3.8 12/28/2023 0811    CL 98 12/28/2023 0811    CO2 26 12/28/2023 0811    BUN 13 12/28/2023 0811    CREATININE 0.68 12/28/2023 0811         Component Value Date/Time    CALCIUM 9.6 12/28/2023 0811    ALKPHOS 41 12/28/2023 0811     (H) 12/28/2023 0811     (H) 12/28/2023 0811      No results found for: \"CLARITYU\", \"COLORU\", \"SPECGRAV\", \"PHUR\", \"GLUCOSEU\", \"KETONESU\", \"BLOODU\", \"PROTEIN UA\", \"NITRITE\", \"BILIRUBINUR\", \"UROBILINOGEN\", \"LEUKOCYTESUR\", \"WBCUA\", \"RBCUA\", \"HYALINE\", \"BACTERIA\", \"EPIS\"     Liver Function Test: ASA     Lab Results   Component Value Date    TBILI 0.73 12/28/2023    ALKPHOS 41 12/28/2023     (H) 12/28/2023     (H) 12/28/2023    TP 7.6 12/28/2023    ALB 4.8 12/28/2023      No results found for: \"SALICYLATE\"   Troponin APAP     No results found for: \"TROPONINI\"   Lab Results   Component Value Date    ACTMNPHEN <2 (L) 04/02/2017      VBG HCG     No results found for: \"PHVEN\", \"LAY0OHR\", \"PO2VEN\", \"OXK5UXQ\", \"BEVEN\", \"R0HUTYJCZ\", \"J6QIVWX\"   No results found for: \"HCGQUANT\"   ABG Urine Drug Screen     No results found for: \"PHART\", \"KKX7QRJ\", \"PO2ART\", \"YHB8PYG\", \"BEART\", \"Z5FGTWQNG\", \"O2HGB\", \"SOURC\", \"MADONNA\", \"VTAC\", \"ACRATE\", \"INSPIREDAIR\", \"PEEP\"   Lab Results   Component Value Date    AMPMETHUR Negative 04/01/2017    BARBTUR Negative 04/01/2017    BDZUR " "Negative 04/01/2017    COCAINEUR Positive (A) 04/01/2017    METHADONEUR Negative 04/01/2017    OPIATEUR Negative 04/01/2017    PCPUR Negative 04/01/2017    THCUR Positive (A) 04/01/2017      Lactate INR     No results found for: \"LACTICACID\"   Lab Results   Component Value Date    INR 0.91 04/01/2017      PTT Protime     Lab Results   Component Value Date/Time    PTT 25 04/01/2017 09:36 PM        Lab Results   Component Value Date/Time    PROTIME 12.4 04/01/2017 09:36 PM              Imaging Studies: I have personally reviewed pertinent reports.        Counseling / Coordination of Care  Total floor / unit time spent today 40  minutes. Greater than 50% of total time was spent with the patient and / or family counseling and / or coordination of care.     Mk Mcdowell MD  PGY-II, Psychiatry Resident    ** Please Note: This note has been constructed using a voice recognition system. **     "

## 2023-12-28 NOTE — ASSESSMENT & PLAN NOTE
Patient endorses history of alcohol abuse starting from childhood. Endorses occasional cocaine and THC use throughout this time. Became sober in 2017. Detox and 90-day inpatient rehab treatment, followed by sober living residence before returning home. Has been committed to daily AA meetings and active engagement in recovery for the past 5 years. Stopped attending peer recovery meetings and relapsed on alcohol 9 months ago. Has been consuming 750ml of vodka and 4-5 beers daily for the past 6 months. Poor nutrition during this time. Family reports notable confusion lately (intoxication VS wernicke), he notices mild/moderate unsteadiness over past several months as well as numbness in legs and fingertips. Ataxia present on exam. No ophthalmoplegia noted on exam. Not interested in naltrexone as medication was intolerable to patient during previous inpatient D/A treatment. Patient unsure on disposition. Recovery support system at home with history of sustained sobriety.  Alcohol withdrawal management as noted above, including nutritional therapy.  Symptoms of wernicke (ataxia, confusion) and alcoholic polyneuropathy (numbness in UE and LE) to be monitored.  Patient to work with CM to determine appropriate aftercare.

## 2023-12-28 NOTE — ASSESSMENT & PLAN NOTE
Lab Results   Component Value Date     (H) 12/28/2023     (H) 12/28/2023    ALKPHOS 41 12/28/2023    TBILI 0.73 12/28/2023        Elevated LFTs on admission, likely 2/2 chronic alcohol use/alcoholic liver disease. History of chronic Hep C, s/p 8 week treatment of Mavyrick with undetectable viral level at 4 weeks. Did not complete recommended 12, 24 week level, but without cirrhosis on U/S at that time. Pt endorses abdominal pain, and has moderate tenderness upon palpation of the RUQ. RUQ tenderness started several months ago. No IV drug or cocaine use during past year.  IVF  CMP QD  RUQ U/S  Repeat Hepatitis panel  Encourage alcohol cessation

## 2023-12-28 NOTE — ED PROVIDER NOTES
"History  Chief Complaint   Patient presents with    Detox Evaluation     Patient requesting alcohol detox, \"a big bottle of vodka, and some beers\". Recent relapse 4 months ago. Last drink after midnight. No hx of alcohol seizures.     58-year-old male, history of alcohol abuse, presenting requesting detox.  Patient states he drinks large amount of alcohol daily for several months, last drink earlier this morning.  Denies any associated drug use.  Reports nausea and general malaise.  Sister who is present with patient states that he has been more confused over the past week.      History provided by:  Patient and relative  Detox Evaluation  Associated symptoms: confusion, nausea and shortness of breath        Prior to Admission Medications   Prescriptions Last Dose Informant Patient Reported? Taking?   folic acid (FOLVITE) 1 mg tablet   No No   Sig: Take 1 tablet by mouth daily for 30 days   nicotine (NICODERM CQ) 21 mg/24 hr TD 24 hr patch   No No   Sig: Place 1 patch on the skin daily for 30 days      Facility-Administered Medications: None       Past Medical History:   Diagnosis Date    Alcoholism (HCC)     Withdrawal symptoms, alcohol (HCC)        History reviewed. No pertinent surgical history.    History reviewed. No pertinent family history.  I have reviewed and agree with the history as documented.    E-Cigarette/Vaping    E-Cigarette Use Never User      E-Cigarette/Vaping Substances     Social History     Tobacco Use    Smoking status: Every Day     Current packs/day: 2.00     Types: Cigarettes    Smokeless tobacco: Current   Vaping Use    Vaping status: Never Used   Substance Use Topics    Alcohol use: Yes     Comment: 30 pack in two days    Drug use: Not Currently     Types: Cocaine       Review of Systems   Constitutional:  Negative for fever.   Respiratory:  Positive for shortness of breath.    Cardiovascular: Negative.    Gastrointestinal:  Positive for nausea.   Psychiatric/Behavioral:  Positive for " confusion.        Physical Exam  Physical Exam  Vitals and nursing note reviewed.   Constitutional:       General: He is not in acute distress.  HENT:      Head: Normocephalic and atraumatic.      Mouth/Throat:      Mouth: Mucous membranes are moist.      Pharynx: Oropharynx is clear.   Eyes:      Extraocular Movements: Extraocular movements intact.      Conjunctiva/sclera: Conjunctivae normal.      Pupils: Pupils are equal, round, and reactive to light.   Cardiovascular:      Rate and Rhythm: Normal rate and regular rhythm.   Pulmonary:      Effort: Pulmonary effort is normal.      Breath sounds: Normal breath sounds.   Abdominal:      Palpations: Abdomen is soft.      Tenderness: There is no abdominal tenderness.   Musculoskeletal:         General: Normal range of motion.   Skin:     General: Skin is warm and dry.      Coloration: Skin is not jaundiced.   Neurological:      General: No focal deficit present.      Mental Status: He is alert and oriented to person, place, and time.      Motor: No weakness.      Comments: No tremors         Vital Signs  ED Triage Vitals [12/28/23 0740]   Temperature Pulse Respirations Blood Pressure SpO2   98.2 °F (36.8 °C) 85 20 154/95 95 %      Temp Source Heart Rate Source Patient Position - Orthostatic VS BP Location FiO2 (%)   Oral Monitor Lying Left arm --      Pain Score       --           Vitals:    12/28/23 0740   BP: 154/95   Pulse: 85   Patient Position - Orthostatic VS: Lying         Visual Acuity      ED Medications  Medications   thiamine (VITAMIN B1) 100 mg in sodium chloride 0.9 % 50 mL IVPB (has no administration in time range)   folic acid 1 mg in sodium chloride 0.9 % 50 mL IVPB (has no administration in time range)   sodium chloride 0.9 % infusion (has no administration in time range)   multivitamin-minerals (CENTRUM) tablet 1 tablet (has no administration in time range)       Diagnostic Studies  Results Reviewed       Procedure Component Value Units Date/Time     Ammonia [[id number]]  (Normal) Collected: 12/28/23 0811    Lab Status: Final result Specimen: Blood from Arm, Left Updated: 12/28/23 0838     Ammonia 33 umol/L     Comprehensive metabolic panel [[id number]]  (Abnormal) Collected: 12/28/23 0811    Lab Status: Final result Specimen: Blood from Arm, Left Updated: 12/28/23 0836     Sodium 138 mmol/L      Potassium 3.8 mmol/L      Chloride 98 mmol/L      CO2 26 mmol/L      ANION GAP 14 mmol/L      BUN 13 mg/dL      Creatinine 0.68 mg/dL      Glucose 91 mg/dL      Calcium 9.6 mg/dL       U/L       U/L      Alkaline Phosphatase 41 U/L      Total Protein 7.6 g/dL      Albumin 4.8 g/dL      Total Bilirubin 0.73 mg/dL      eGFR 104 ml/min/1.73sq m     Narrative:      National Kid[employer] guidelines for Chronic Kidney Disease (CKD):     Stage 1 with normal or high GFR (GFR > 90 mL/min/1.73 square meters)    Stage 2 Mild CKD (GFR = 60-89 mL/min/1.73 square meters)    Stage 3A Moderate CKD (GFR = 45-59 mL/min/1.73 square meters)    Stage 3B Moderate CKD (GFR = 30-44 mL/min/1.73 square meters)    Stage 4 Severe CKD (GFR = 15-29 mL/min/1.73 square meters)    Stage 5 End Stage CKD (GFR <15 mL/min/1.73 square meters)  Note: GFR calculation is accurate only with a steady state creatinine    Magnesium [[id number]]  (Normal) Collected: 12/28/23 0811    Lab Status: Final result Specimen: Blood from Arm, Left Updated: 12/28/23 0836     Magnesium 2.0 mg/dL     Ethanol [[id number]]  (Abnormal) Collected: 12/28/23 0811    Lab Status: Final result Specimen: Blood from Arm, Left Updated: 12/28/23 0834     Ethanol Lvl 181 mg/dL     CBC and differential [[id number]]  (Abnormal) Collected: 12/28/23 0811    Lab Status: Final result Specimen: Blood from Arm, Left Updated: 12/28/23 0827     WBC 4.50 Thousand/uL      RBC 5.08 Million/uL      Hemoglobin 16.5 g/dL      Hematocrit 47.0 %      MCV 93 fL      MCH 32.5 pg      MCHC 35.1 g/dL      RDW 13.2 %      MPV 11.1 fL       Platelets 115 Thousands/uL      nRBC 0 /100 WBCs      Neutrophils Relative 63 %      Immat GRANS % 0 %      Lymphocytes Relative 26 %      Monocytes Relative 9 %      Eosinophils Relative 1 %      Basophils Relative 1 %      Neutrophils Absolute 2.82 Thousands/µL      Immature Grans Absolute 0.01 Thousand/uL      Lymphocytes Absolute 1.17 Thousands/µL      Monocytes Absolute 0.41 Thousand/µL      Eosinophils Absolute 0.04 Thousand/µL      Basophils Absolute 0.05 Thousands/µL                    No orders to display              Procedures  ECG 12 Lead Documentation Only    Date/Time: 12/28/2023 8:37 AM    Performed by: Tor Camp MD  Authorized by: Tor Camp MD    ECG reviewed by me, the ED Provider: yes    Patient location:  ED  Rate:     ECG rate assessment: normal    Rhythm:     Rhythm: sinus rhythm    Ectopy:     Ectopy: PVCs    QRS:     QRS axis:  Normal    QRS intervals:  Normal  Conduction:     Conduction: normal    ST segments:     ST segments:  Normal  Comments:      Sinus rhythm at 78, no acute changes           ED Course                               SBIRT 20yo+      Flowsheet Row Most Recent Value   Initial Alcohol Screen: US AUDIT-C     1. How often do you have a drink containing alcohol? 6 Filed at: 12/28/2023 0742   2. How many drinks containing alcohol do you have on a typical day you are drinking?  6 Filed at: 12/28/2023 0742   3a. Male UNDER 65: How often do you have five or more drinks on one occasion? 6 Filed at: 12/28/2023 0742   Audit-C Score 18 Filed at: 12/28/2023 0742   Full Alcohol Screen: US AUDIT    4. How often during the last year have you found that you were not able to stop drinking once you had started? 4 Filed at: 12/28/2023 0742   5. How often during past year have you failed to do what was normally expected of you because of drinking?  4 Filed at: 12/28/2023 0742   6. How often in past year have you needed a first drink in the morning to get yourself going after a  heavy drinking session?  4 Filed at: 12/28/2023 0742   7. How often in past year have you had feeling of guilt or remorse after drinking?  4 Filed at: 12/28/2023 0742   8. How often in past year have you been unable to remember what happened night before because you had been drinking?  3 Filed at: 12/28/2023 0742   9. Have you or someone else been injured as a result of your drinking?  0 Filed at: 12/28/2023 0742   10. Has a relative, friend, doctor or other health worker been concerned about your drinking and suggested you cut down?  4 Filed at: 12/28/2023 0742   AUDIT Total Score 41 Filed at: 12/28/2023 0742   EDDY: How many times in the past year have you...    Used an illegal drug or used a prescription medication for non-medical reasons? Never Filed at: 12/28/2023 0742                      Medical Decision Making  58-year-old male, history of alcohol abuse, presenting requesting detox for alcohol withdrawal.  Differential diagnosis includes acute alcohol intoxication, acute alcohol withdrawal, dehydration among other diagnoses.  Patient is awake and alert, noted to be normal sinus rhythm on cardiac monitor.  EKG and labs ordered, will discuss with on-call detox, continue to monitor in ED and reevaluate.    Sister reports patient has not been eating or drinking much, concerned that he is more confused.  Will give IV fluids with thiamine and folate.    Patient accepted for admission to inpatient detox.    Amount and/or Complexity of Data Reviewed  Labs: ordered. Decision-making details documented in ED Course.  ECG/medicine tests: ordered and independent interpretation performed. Decision-making details documented in ED Course.    Risk  OTC drugs.  Prescription drug management.             Disposition  Final diagnoses:   Alcohol abuse     Time reflects when diagnosis was documented in both MDM as applicable and the Disposition within this note       Time User Action Codes Description Comment    12/28/2023  8:41  Tor Jack Add [F10.10] Alcohol abuse           ED Disposition       ED Disposition   Admit    Condition   Stable    Date/Time   u Dec 28, 2023 0841    Comment   Case was discussed with KEZIA Tillman and the patient's admission status was agreed to be Admission Status: inpatient status to the service of Dr. Cassidy .               Follow-up Information    None         Patient's Medications   Discharge Prescriptions    No medications on file       No discharge procedures on file.    PDMP Review       None            ED Provider  Electronically Signed by             Tor Camp MD  12/28/23 0828

## 2023-12-28 NOTE — ASSESSMENT & PLAN NOTE
I personally provided tobacco cessation education greater than 7 minutes. Patient currently smokes 2 PPD.  NRT

## 2023-12-28 NOTE — ASSESSMENT & PLAN NOTE
"Patient reports 6-9 months of chronic alcohol use. 1 750ml bottle of vodka + several beers daily. Reports drinking a \"large glass of vodka\" at 0230 12/28/23. Presented to ED at the recommendation of wife and AA peer. Presentation to ED with ethanol of 181 at 0800. Received multivitamin, thiamine, folic acid in the ED. Symptoms upon admission to unit include moderate anxiety, mild agitation, confusion, diaphoresis, mild tremulousness.  Initiate SEWS protocol.  Thiamine 500mg IV Q8H, Folic acid 1mg IV QD, Multivitamin PO QD, and IVF.  "

## 2023-12-29 PROBLEM — K21.9 GERD (GASTROESOPHAGEAL REFLUX DISEASE): Status: ACTIVE | Noted: 2023-12-29

## 2023-12-29 LAB
ALBUMIN SERPL BCP-MCNC: 4.1 G/DL (ref 3.5–5)
ALP SERPL-CCNC: 35 U/L (ref 34–104)
ALT SERPL W P-5'-P-CCNC: 95 U/L (ref 7–52)
ANION GAP SERPL CALCULATED.3IONS-SCNC: 12 MMOL/L
AST SERPL W P-5'-P-CCNC: 75 U/L (ref 13–39)
BILIRUB SERPL-MCNC: 1.13 MG/DL (ref 0.2–1)
BUN SERPL-MCNC: 11 MG/DL (ref 5–25)
CALCIUM SERPL-MCNC: 8.2 MG/DL (ref 8.4–10.2)
CHLORIDE SERPL-SCNC: 99 MMOL/L (ref 96–108)
CO2 SERPL-SCNC: 24 MMOL/L (ref 21–32)
CREAT SERPL-MCNC: 0.63 MG/DL (ref 0.6–1.3)
ERYTHROCYTE [DISTWIDTH] IN BLOOD BY AUTOMATED COUNT: 13.2 % (ref 11.6–15.1)
GFR SERPL CREATININE-BSD FRML MDRD: 107 ML/MIN/1.73SQ M
GLUCOSE SERPL-MCNC: 76 MG/DL (ref 65–140)
HAV IGM SER QL: ABNORMAL
HBV CORE IGM SER QL: ABNORMAL
HBV SURFACE AG SER QL: ABNORMAL
HCT VFR BLD AUTO: 43.9 % (ref 36.5–49.3)
HCV AB SER QL: REACTIVE
HGB BLD-MCNC: 15.5 G/DL (ref 12–17)
MCH RBC QN AUTO: 32.8 PG (ref 26.8–34.3)
MCHC RBC AUTO-ENTMCNC: 35.3 G/DL (ref 31.4–37.4)
MCV RBC AUTO: 93 FL (ref 82–98)
PLATELET # BLD AUTO: 85 THOUSANDS/UL (ref 149–390)
PMV BLD AUTO: 12 FL (ref 8.9–12.7)
POTASSIUM SERPL-SCNC: 3.5 MMOL/L (ref 3.5–5.3)
PROT SERPL-MCNC: 6.6 G/DL (ref 6.4–8.4)
RBC # BLD AUTO: 4.73 MILLION/UL (ref 3.88–5.62)
SODIUM SERPL-SCNC: 135 MMOL/L (ref 135–147)
WBC # BLD AUTO: 7.1 THOUSAND/UL (ref 4.31–10.16)

## 2023-12-29 PROCEDURE — 85027 COMPLETE CBC AUTOMATED: CPT

## 2023-12-29 PROCEDURE — 99233 SBSQ HOSP IP/OBS HIGH 50: CPT | Performed by: EMERGENCY MEDICINE

## 2023-12-29 PROCEDURE — 80074 ACUTE HEPATITIS PANEL: CPT | Performed by: EMERGENCY MEDICINE

## 2023-12-29 PROCEDURE — 80053 COMPREHEN METABOLIC PANEL: CPT

## 2023-12-29 RX ORDER — FOLIC ACID 1 MG/1
1 TABLET ORAL DAILY
Status: DISCONTINUED | OUTPATIENT
Start: 2023-12-29 | End: 2023-12-31 | Stop reason: HOSPADM

## 2023-12-29 RX ORDER — PHENOBARBITAL SODIUM 130 MG/ML
130 INJECTION INTRAMUSCULAR ONCE
Status: COMPLETED | OUTPATIENT
Start: 2023-12-29 | End: 2023-12-29

## 2023-12-29 RX ORDER — PANTOPRAZOLE SODIUM 20 MG/1
20 TABLET, DELAYED RELEASE ORAL
Status: DISCONTINUED | OUTPATIENT
Start: 2023-12-29 | End: 2023-12-29

## 2023-12-29 RX ORDER — LANOLIN ALCOHOL/MO/W.PET/CERES
100 CREAM (GRAM) TOPICAL DAILY
Status: DISCONTINUED | OUTPATIENT
Start: 2023-12-29 | End: 2023-12-31 | Stop reason: HOSPADM

## 2023-12-29 RX ORDER — PANTOPRAZOLE SODIUM 20 MG/1
20 TABLET, DELAYED RELEASE ORAL
Status: DISCONTINUED | OUTPATIENT
Start: 2023-12-29 | End: 2023-12-31 | Stop reason: HOSPADM

## 2023-12-29 RX ADMIN — PHENOBARBITAL SODIUM 130 MG: 130 INJECTION INTRAMUSCULAR; INTRAVENOUS at 11:33

## 2023-12-29 RX ADMIN — THIAMINE HYDROCHLORIDE 500 MG: 100 INJECTION, SOLUTION INTRAMUSCULAR; INTRAVENOUS at 05:47

## 2023-12-29 RX ADMIN — ENOXAPARIN SODIUM 40 MG: 100 INJECTION SUBCUTANEOUS at 08:04

## 2023-12-29 RX ADMIN — MULTIPLE VITAMINS W/ MINERALS TAB 1 TABLET: TAB ORAL at 08:04

## 2023-12-29 RX ADMIN — PANTOPRAZOLE SODIUM 20 MG: 20 TABLET, DELAYED RELEASE ORAL at 21:02

## 2023-12-29 RX ADMIN — FOLIC ACID 1 MG: 5 INJECTION, SOLUTION INTRAMUSCULAR; INTRAVENOUS; SUBCUTANEOUS at 08:04

## 2023-12-29 RX ADMIN — PHENOBARBITAL SODIUM 130 MG: 130 INJECTION INTRAMUSCULAR; INTRAVENOUS at 05:55

## 2023-12-29 RX ADMIN — PHENOBARBITAL SODIUM 130 MG: 130 INJECTION INTRAMUSCULAR; INTRAVENOUS at 08:15

## 2023-12-29 NOTE — NURSING NOTE
SEWS score deferred at this time, as pt is sleeping.  HR, SpO2, and RR WNL, no visible s/s of distress at this time.

## 2023-12-29 NOTE — ASSESSMENT & PLAN NOTE
Patient endorses history of alcohol abuse starting from childhood. Endorses occasional cocaine and THC use throughout this time. Became sober in 2017. Detox and 90-day inpatient rehab treatment, followed by sober living residence before returning home. Has been committed to daily AA meetings and active engagement in recovery for the past 5 years. Stopped attending peer recovery meetings and relapsed on alcohol 9 months ago. Has been consuming 750ml of vodka and 4-5 beers daily for the past 6 months. Poor nutrition during this time. Family reports notable confusion lately (intoxication VS wernicke), he notices mild/moderate unsteadiness over past several months as well as numbness in legs and fingertips. Ataxia present on exam. No ophthalmoplegia noted on exam. Not interested in naltrexone as medication was intolerable to patient during previous inpatient D/A treatment. Patient unsure on disposition. Recovery support system at home with history of sustained sobriety.    12/29/23: Ataxia improving, extremity numbness improving. Patient not interested in Campral or Naltrexone.  Alcohol withdrawal management as noted above, including nutritional therapy.  Symptoms of wernicke (ataxia, confusion) and alcoholic polyneuropathy (numbness in UE and LE) to be monitored.  Patient to work with CM to determine appropriate aftercare.

## 2023-12-29 NOTE — ASSESSMENT & PLAN NOTE
"Patient reports 6-9 months of chronic alcohol use. 1 750ml bottle of vodka + several beers daily. Reports drinking a \"large glass of vodka\" at 0230 12/28/23. Presented to ED at the recommendation of wife and AA peer. Presentation to ED with ethanol of 181 at 0800.   Discontinue SEWS protocol  Received a total of 1040 mg of phenobarbital. Last Dose Administered on 12/29/23 at 1133  Will monitor off of protocol to ensure complete resolution of withdrawal symptoms.   "

## 2023-12-29 NOTE — PROGRESS NOTES
12/29/23 0940   Alcohol Use   Q1: How often do you have a drink containing alcohol? 4 or more ti   Q2: How many drinks containing alcohol do you have on a typical day when you are drinking? 5 or 6  (Pt reports daily use of 750ml Vodka and 4-5 beers.)   Q3: How often do you have six or more drinks on one occasion? Daily

## 2023-12-29 NOTE — PROGRESS NOTES
"PROGRESS NOTE  DEPARTMENT OF MEDICAL TOXICOLOGY  LEVEL 4 MEDICAL DETOX UNIT  Steve Dunham 58 y.o. male MRN: [id number]  Unit/Bed#: 5T DETOX 511-01 Encounter: [id number]      Reason for Admission/Principal Problem: Alcohol Detox  Rounding Provider: Mk Mcdowell MD  Attending Provider: Opal Murcia*   12/28/2023  7:39 AM       Alcohol withdrawal syndrome with complication (HCC)  Assessment & Plan  Patient reports 6-9 months of chronic alcohol use. 1 750ml bottle of vodka + several beers daily. Reports drinking a \"large glass of vodka\" at 0230 12/28/23. Presented to ED at the recommendation of wife and AA peer. Presentation to ED with ethanol of 181 at 0800. Received multivitamin, thiamine, folic acid in the ED. Symptoms upon admission to unit include moderate anxiety, mild agitation, confusion, diaphoresis, mild tremulousness.     12/29/23: Total phenobarbital 910mg, mild tremulousness, fatigue, weakness.  Continue SEWS protocol for one more day.  Thiamine 100mg PO QD, Folic acid 1mg PO QD, Multivitamin PO QD.    Alcohol use disorder, severe, dependence (HCC)  Assessment & Plan  Patient endorses history of alcohol abuse starting from childhood. Endorses occasional cocaine and THC use throughout this time. Became sober in 2017. Detox and 90-day inpatient rehab treatment, followed by sober living residence before returning home. Has been committed to daily AA meetings and active engagement in recovery for the past 5 years. Stopped attending peer recovery meetings and relapsed on alcohol 9 months ago. Has been consuming 750ml of vodka and 4-5 beers daily for the past 6 months. Poor nutrition during this time. Family reports notable confusion lately (intoxication VS wernicke), he notices mild/moderate unsteadiness over past several months as well as numbness in legs and fingertips. Ataxia present on exam. No ophthalmoplegia noted on exam. Not interested in naltrexone as medication was " intolerable to patient during previous inpatient D/A treatment. Patient unsure on disposition. Recovery support system at home with history of sustained sobriety.    12/29/23: Ataxia improving, extremity numbness improving. Patient not interested in Campral or Naltrexone.  Alcohol withdrawal management as noted above, including nutritional therapy.  Symptoms of wernicke (ataxia, confusion) and alcoholic polyneuropathy (numbness in UE and LE) to be monitored.  Patient to work with CM to determine appropriate aftercare.    Transaminitis  Assessment & Plan  Lab Results   Component Value Date    ALT 95 (H) 12/29/2023    AST 75 (H) 12/29/2023    ALKPHOS 35 12/29/2023    TBILI 1.13 (H) 12/29/2023        Elevated LFTs on admission, likely 2/2 chronic alcohol use/alcoholic liver disease. History of chronic Hep C, s/p 8 week treatment of [facility] with undetectable viral level at 4 weeks. Did not complete recommended 12, 24 week level, but without cirrhosis on U/S at that time. Pt endorses abdominal pain, and has moderate tenderness upon palpation of the RUQ. RUQ tenderness started several months ago. No IV drug or cocaine use during past year.    12/29/23: RUQ U/S with mild steatosis. Patient reports improving RUQ, no longer tender to palpation.  CMP QD  Repeat Hepatitis panel  Encourage alcohol cessation     Nicotine dependence  Assessment & Plan  I personally provided tobacco cessation education greater than 7 minutes. Patient currently smokes 2 PPD.  NRT    GERD (gastroesophageal reflux disease)  Assessment & Plan  Patient reports longstanding GERD and taking prilosec 20mg QD over the counter. Endorsing acid-reflux symptoms with meals.  Omeprazole 20mg QD.            VTE Pharmacologic Prophylaxis:   Pharmacologic: Enoxaparin (Lovenox)  Mechanical VTE Prophylaxis in Place: no    Code Status: Level 1 - Full Code    Patient Centered Rounds: I have performed bedside rounds with nursing staff today.    Discussions with  Specialists or Other Care Team Provider: No     Education and Discussions with Family / Patient: Yes    Time Spent for Care: 1 hour.  More than 50% of total time spent on counseling and coordination of care as described above.    Current Length of Stay: 1 day(s)    Current Patient Status: Inpatient     Certification Statement: The patient will continue to require additional inpatient hospital stay due to Alcohol Detox  Discharge Plan: Anticipate in 2 days.        Subjective:   Patient seen for follow up. Discuss U/S findings. Reports improvement in extremity numbness, agitation, anxiety. Still has mild fatigue and weakness. Reports acid-reflux symptoms when eating, and endorses taking 20mg protonix QD at home. Improvement in RUQ pain. Discuss continuing SEWS for another day. Denies CP, SOB.    Objective:     Clinical Opiate Withdrawal Scale  Pulse: 91    SEWS Total Score: 8 (2023  8:00 AM)        Last 24 Hours Medication List:   Current Facility-Administered Medications   Medication Dose Route Frequency Provider Last Rate    acetaminophen  650 mg Oral Q6H PRN Nelda Tillman PA-C      folic acid  1 mg Oral Daily Mk Mcdowell MD      melatonin  6 mg Oral HS PRN Isadora Lewis PA-C      multivitamin-minerals  1 tablet Oral Daily Tor Camp MD      ondansetron  4 mg Intravenous Q6H PRN Nelda Tillman PA-C      pantoprazole  20 mg Oral Early Morning Mk Mcdowell MD      thiamine  100 mg Oral Daily Mk Mcdowell MD      traZODone  50 mg Oral HS PRN Nelda Tillman PA-C           Vitals:   Temp (24hrs), Av.7 °F (36.5 °C), Min:97.4 °F (36.3 °C), Max:98.2 °F (36.8 °C)    Temp:  [97.4 °F (36.3 °C)-98.2 °F (36.8 °C)] 97.5 °F (36.4 °C)  HR:  [74-91] 91  Resp:  [16] 16  BP: (119-135)/(64-91) 135/84  SpO2:  [91 %-95 %] 93 %  Body mass index is 25.53 kg/m².     Input and Output Summary (last 24 hours):No intake or output data in the 24 hours ending 23  1034    Physical Exam:   Physical Exam  Constitutional:       Appearance: Normal appearance. He is normal weight.   HENT:      Head: Normocephalic and atraumatic.      Right Ear: External ear normal.      Left Ear: External ear normal.      Mouth/Throat:      Mouth: Mucous membranes are moist.      Pharynx: Oropharynx is clear.   Eyes:      Extraocular Movements: Extraocular movements intact.      Conjunctiva/sclera: Conjunctivae normal.   Cardiovascular:      Rate and Rhythm: Normal rate and regular rhythm.      Pulses: Normal pulses.      Heart sounds: Normal heart sounds.   Pulmonary:      Effort: Pulmonary effort is normal.      Breath sounds: Normal breath sounds. No wheezing or rales.   Abdominal:      General: Bowel sounds are normal. There is no distension.      Palpations: Abdomen is soft.      Tenderness: There is no abdominal tenderness.   Musculoskeletal:         General: Normal range of motion.   Skin:     General: Skin is warm.      Capillary Refill: Capillary refill takes less than 2 seconds.   Neurological:      General: No focal deficit present.      Mental Status: Mental status is at baseline.   Psychiatric:         Mood and Affect: Mood normal.         Behavior: Behavior normal.         Additional Data:     Labs:   Results from last 7 days   Lab Units 12/29/23  0549 12/28/23  0811   WBC Thousand/uL 7.10 4.50   HEMOGLOBIN g/dL 15.5 16.5   HEMATOCRIT % 43.9 47.0   PLATELETS Thousands/uL 85* 115*   NEUTROS PCT %  --  63   LYMPHS PCT %  --  26   MONOS PCT %  --  9   EOS PCT %  --  1      Results from last 7 days   Lab Units 12/29/23  0549   SODIUM mmol/L 135   POTASSIUM mmol/L 3.5   CHLORIDE mmol/L 99   CO2 mmol/L 24   BUN mg/dL 11   CREATININE mg/dL 0.63   ANION GAP mmol/L 12   CALCIUM mg/dL 8.2*   ALBUMIN g/dL 4.1   TOTAL BILIRUBIN mg/dL 1.13*   ALK PHOS U/L 35   ALT U/L 95*   AST U/L 75*   GLUCOSE RANDOM mg/dL 76                              * I Have Reviewed All Lab Data Listed Above.  *  Additional Pertinent Lab Tests Reviewed: All Labs For Current Hospital Admission Reviewed      Imaging Studies: I have personally reviewed pertinent reports.        Recent Cultures (last 7 days):          Today, Patient Was Seen By: Mk Mcdowell MD    ** Please Note: Dictation voice to text software may have been used in the creation of this document. **

## 2023-12-29 NOTE — UTILIZATION REVIEW
"Initial Clinical Review    Pt presented to [facility] for its Level IV medically managed intensive inpatient detox unit.    Admission: Date/Time/Statement:   Admission Orders (From admission, onward)       Ordered        12/28/23 0842  INPATIENT ADMISSION  Once                          Orders Placed This Encounter   Procedures    INPATIENT ADMISSION     Standing Status:   Standing     Number of Occurrences:   1     Order Specific Question:   Level of Care     Answer:   Med Surg [16]     Order Specific Question:   Estimated length of stay     Answer:   More than 2 Midnights     Order Specific Question:   Certification     Answer:   I certify that inpatient services are medically necessary for this patient for a duration of greater than two midnights. See H&P and MD Progress Notes for additional information about the patient's course of treatment.     ED Arrival Information       Expected   -    Arrival   12/28/2023 07:21    Acuity   Urgent              Means of arrival   Walk-In    Escorted by   Family Member    Service   Medical Toxicology    Admission type   Emergency              Arrival complaint   alcohol withdraw             Chief Complaint   Patient presents with    Detox Evaluation     Patient requesting alcohol detox, \"a big bottle of vodka, and some beers\". Recent relapse 4 months ago. Last drink after midnight. No hx of alcohol seizures.       Initial Presentation: 58 y.o. male who presented to medical detox. Inpatient admission for evaluation and treatment of alcohol withdrawal syndrome. Presented w/ need for detox from alcohol. Serum ETOH: 181. Reports 4-5 twelve ounce beers and 750 mL vodka daily, last drink on 12/28 @ 0200. Has prior rehab treatment for withdrawal. Reports no hx of withdrawal seizures. On exam, anxiety, insomnia, diaphoresis, tachycardia, RUQ tenderness, ataxia on finger to nose testing, agitation. SEWS 11. Plan: SEWS monitoring w/ phenobarbital management, high-dose IV " thiamine/folic acid supplement, IVF, telemetry, continuous pulse ox, trend labs, replete electrolytes as needed. Check Hepatitis panel.       Date: 12/29/23       Day 2: Pt reports ongoing fatigue and weakness.  On exam, anxious, tremors. SEWS 8. Plan: continue SEWS monitoring w/ phenobarbital management, high-dose IV thiamine/folic acid supplement, telemetry, continuous pulse ox, continue current meds, trend labs, replete electrolytes as needed. Resume PPI.     ED Triage Vitals   Temperature Pulse Respirations Blood Pressure SpO2   12/28/23 0740 12/28/23 0740 12/28/23 0740 12/28/23 0740 12/28/23 0740   98.2 °F (36.8 °C) 85 20 154/95 95 %      Temp Source Heart Rate Source Patient Position - Orthostatic VS BP Location FiO2 (%)   12/28/23 0740 12/28/23 0740 12/28/23 0740 12/28/23 0740 --   Oral Monitor Lying Left arm       Pain Score       12/28/23 0949       No Pain          Wt Readings from Last 1 Encounters:   12/28/23 73.9 kg (163 lb)     Vital Signs:   Date/Time Temp Pulse Resp BP MAP (mmHg) SpO2 O2 Device   12/29/23 0809 97.5 °F (36.4 °C) 91 16 135/84 -- 93 % None (Room air)   12/29/23 0800 98.2 °F (36.8 °C) 84 16 133/91 -- 95 % None (Room air)   12/29/23 0500 97.5 °F (36.4 °C) 83 16 135/87 103 95 % None (Room air)   12/28/23 2032 97.5 °F (36.4 °C) 84 16 119/64 82 95 % None (Room air)   12/28/23 1725 97.4 °F (36.3 °C) Abnormal  79 16 128/75 92 93 % None (Room air)   12/28/23 1500 -- 78 -- -- -- 94 % None (Room air)   12/28/23 1310 -- 74 -- -- -- 91 % None (Room air)   12/28/23 1233 98.2 °F (36.8 °C) 85 16 123/74 -- 93 % None (Room air)   12/28/23 1000 98 °F (36.7 °C) 70 18 137/73 -- 93 % None (Room air)         Severity of Ethanol Withdrawal Scale (SEWS):   Row Name 12/29/23 0800 12/29/23 0500 12/28/23 2300 12/28/23 2032 12/28/23 1815   Severity of Ethanol Withdrawal Scale (SEWS)   ANXIETY: Do you feel that something bad is about to happen to you right now? 3  -KH 3  -NR 0  -NR 3  -NR 0  -WL   NAUSEA and DRY  HEAVES or VOMITING? 0  -KH 0  -NR 0  -NR 3  -NR 0  -WL   SWEATING: (includes moist palms, sweating now)? Score 0 or 2 0  -KH 0  -NR 0  -NR 0  -NR 0  -WL   TREMOR: with arms extended eyes closed? 2  -KH 2  -NR 0  -NR 2  -NR 0  -WL   AGITATION: Fidgety, restless, pacing? 0  -KH 3  -NR 0  -NR 3  -NR 0  -WL   DISORIENTATION: 0  -KH 0  -NR 0  -NR 0  -NR 0  -WL   HALLUCINATIONS: 0  -KH 0  -NR 0  -NR 0  -NR 0  -WL   VITAL SIGNS: ANY (Pulse >110, Diastolic BP >90, Temp >99.6) 3  -KH 0  -NR 0  -NR 0  -NR 0  -WL   SEWS Total Score 8  -KH 8  -NR 0  -NR 11  -NR 0  -WL   Silva Agitation Sedation Scale (RASS)   Silva Agitation Sedation Scale (RASS) 0  -KH -- -- -- -1  -WL   Row Name 12/28/23 1726 12/28/23 1500 12/28/23 1310 12/28/23 1234 12/28/23 1130   Severity of Ethanol Withdrawal Scale (SEWS)   ANXIETY: Do you feel that something bad is about to happen to you right now? 3  -WL 0  -WL 0  sleeping  -WL 3  -WL 0  -WL   NAUSEA and DRY HEAVES or VOMITING? 3  -WL 0  -WL 0  -WL 3  -WL 0  -WL   SWEATING: (includes moist palms, sweating now)? Score 0 or 2 0  -WL 0  -WL 0  -WL 2  -WL 0  -WL   TREMOR: with arms extended eyes closed? 2  -WL 0  -WL 0  -WL 2  -WL 0  -WL   AGITATION: Fidgety, restless, pacing? 0  -WL 0  -WL 0  -WL 0  -WL 0  -WL   DISORIENTATION: 0  -WL 0  -WL 0  -WL 0  -WL 0  -WL   HALLUCINATIONS: 0  -WL 0  -WL 0  -WL 0  -WL 0  -WL   VITAL SIGNS: ANY (Pulse >110, Diastolic BP >90, Temp >99.6) 0  -WL 0  -WL 0  -WL 0  -WL 0  -WL   SEWS Total Score 8  -WL 0  -WL 0  -WL 10  -WL 0  -WL   Silva Agitation Sedation Scale (RASS)   Silva Agitation Sedation Scale (RASS) 0  -WL -1  -WL -1  -WL 0  -WL -1  -WL   Row Name 12/28/23 1030 12/28/23 1003      Severity of Ethanol Withdrawal Scale (SEWS)   ANXIETY: Do you feel that something bad is about to happen to you right now? 0  -WL 3  -WL      NAUSEA and DRY HEAVES or VOMITING? 0  -WL 3  -WL      SWEATING: (includes moist palms, sweating now)? Score 0 or 2 0  -WL 0  -WL       TREMOR: with arms extended eyes closed? 0  -WL 2  -WL      AGITATION: Fidgety, restless, pacing? 0  -WL 0  -WL      DISORIENTATION: 0  -WL 0  -WL      HALLUCINATIONS: 0  -WL 0  -WL      VITAL SIGNS: ANY (Pulse >110, Diastolic BP >90, Temp >99.6) 0  -WL 3  -WL      SEWS Total Score 0  -WL 11  -WL            Pertinent Labs/Diagnostic Test Results:   US right upper quadrant   Final Result by Jorje Sanches MD (12/29 0732)      Mild hepatic steatosis.      Remainder of the examination is normal.      Workstation performed: [id number]               Results from last 7 days   Lab Units 12/29/23  0549 12/28/23  0811   WBC Thousand/uL 7.10 4.50   HEMOGLOBIN g/dL 15.5 16.5   HEMATOCRIT % 43.9 47.0   PLATELETS Thousands/uL 85* 115*   NEUTROS ABS Thousands/µL  --  2.82         Results from last 7 days   Lab Units 12/29/23  0549 12/28/23  0811   SODIUM mmol/L 135 138   POTASSIUM mmol/L 3.5 3.8   CHLORIDE mmol/L 99 98   CO2 mmol/L 24 26   ANION GAP mmol/L 12 14   BUN mg/dL 11 13   CREATININE mg/dL 0.63 0.68   EGFR ml/min/1.73sq m 107 104   CALCIUM mg/dL 8.2* 9.6   MAGNESIUM mg/dL  --  2.0     Results from last 7 days   Lab Units 12/29/23  0549 12/28/23  0811   AST U/L 75* 141*   ALT U/L 95* 139*   ALK PHOS U/L 35 41   TOTAL PROTEIN g/dL 6.6 7.6   ALBUMIN g/dL 4.1 4.8   TOTAL BILIRUBIN mg/dL 1.13* 0.73   AMMONIA umol/L  --  33         Results from last 7 days   Lab Units 12/29/23  0549 12/28/23  0811   GLUCOSE RANDOM mg/dL 76 91     Results from last 7 days   Lab Units 12/28/23  0811   LIPASE u/L 35     Results from last 7 days   Lab Units 12/28/23  1216   CLARITY UA  Clear   COLOR UA  Luisa*   SPEC GRAV UA  1.010   PH UA  7.0   GLUCOSE UA mg/dl Negative   KETONES UA mg/dl 15 (1+)*   BLOOD UA  Negative   PROTEIN UA mg/dl 30 (1+)*   NITRITE UA  Negative   BILIRUBIN UA  Negative   UROBILINOGEN UA mg/dL 4.0*   LEUKOCYTES UA  Negative   WBC UA /hpf 0-1*   RBC UA /hpf None Seen   BACTERIA UA /hpf None Seen   EPITHELIAL  CELLS WET PREP /hpf Occasional     Results from last 7 days   Lab Units 12/28/23  1216   AMPH/METH  Negative   BARBITURATE UR  Positive*   BENZODIAZEPINE UR  Negative   COCAINE UR  Negative   METHADONE URINE  Negative   OPIATE UR  Negative   PCP UR  Negative   THC UR  Positive*     Results from last 7 days   Lab Units 12/28/23  0811   ETHANOL LVL mg/dL 181*         ED Treatment:   Medication Administration from 12/28/2023 0721 to 12/28/2023 0930         Date/Time Order Dose Route Action Comments     12/28/2023 0853 EST thiamine (VITAMIN B1) 100 mg in sodium chloride 0.9 % 50 mL IVPB 100 mg Intravenous New Bag --     12/28/2023 0853 EST folic acid 1 mg in sodium chloride 0.9 % 50 mL IVPB 1 mg Intravenous New Bag --     12/28/2023 0842 EST sodium chloride 0.9 % infusion 125 mL/hr Intravenous New Bag --     12/28/2023 0840 EST multivitamin-minerals (CENTRUM) tablet 1 tablet 1 tablet Oral Given --          Past Medical History:   Diagnosis Date    Alcoholism (HCC)     Withdrawal symptoms, alcohol (HCC)      Present on Admission:   Alcohol withdrawal syndrome with complication (HCC)   Transaminitis   Nicotine dependence      Admitting Diagnosis: Alcohol abuse [F10.10]  Age/Sex: 58 y.o. male  Admission Orders:  Regular Diet. SCDs.  Fall & Seizure Precautions.  SEWS monitoring.  Telemetry & Continuous Pulse Ox.    Scheduled Medications:  enoxaparin, 40 mg, Subcutaneous, Daily  folic acid 1 mg in sodium chloride 0.9 % 50 mL IVPB, 1 mg, Intravenous, Daily  multivitamin-minerals, 1 tablet, Oral, Daily  thiamine, 500 mg, Intravenous, Q8H CONSTANTINO    Continuous IV Infusions:  sodium chloride, 125 mL/hr, Intravenous, Continuous    PRN Meds:  acetaminophen, 650 mg, Oral, Q6H PRN  melatonin, 6 mg, Oral, HS PRN; 12/28 x1  ondansetron, 4 mg, Intravenous, Q6H PRN  traZODone, 50 mg, Oral, HS PRN; 12/28 x1  phenobarbital, 260 mg, Intravenous; 12/28 x1  phenobarbital, 130 mg, Intravenous; 12/28 x3, 12/29 x2        IP CONSULT TO CASE  MANAGEMENT    Network Utilization Review Department  ATTENTION: Please call with any questions or concerns to [phone] and carefully listen to the prompts so that you are directed to the right person. All voicemails are confidential.   For Discharge needs, contact Care Management DC Support Team at [phone] opt. [phone]  Send all requests for admission clinical reviews, approved or denied determinations and any other requests to dedicated fax number below belonging to the campus where the patient is receiving treatment. List of dedicated fax numbers for the Facilities:  FACILITY NAME UR FAX NUMBER   ADMISSION DENIALS (Administrative/Medical Necessity) [phone]   DISCHARGE SUPPORT TEAM (NETWORK) [phone]   PARENT CHILD HEALTH (Maternity/NICU/Pediatrics) [phone]   [facility] [phone]   [facility] [phone]   [facility] [phone]   [facility] [phone]   [facility] [phone]   [facility] [phone]   [facility] [phone]   [facility] [phone]   [facility] [phone]   [facility] [phone]   [facility] [phone]

## 2023-12-29 NOTE — ASSESSMENT & PLAN NOTE
Lab Results   Component Value Date    ALT 95 (H) 12/29/2023    AST 75 (H) 12/29/2023    ALKPHOS 35 12/29/2023    TBILI 1.13 (H) 12/29/2023        Elevated LFTs on admission, likely 2/2 chronic alcohol use/alcoholic liver disease. History of chronic Hep C, s/p 8 week treatment of Mavyrick with undetectable viral level at 4 weeks. Did not complete recommended 12, 24 week level, but without cirrhosis on U/S at that time. Pt endorses abdominal pain, and has moderate tenderness upon palpation of the RUQ. RUQ tenderness started several months ago. No IV drug or cocaine use during past year.    12/29/23: RUQ U/S with mild steatosis. Patient reports improving RUQ, no longer tender to palpation.  CMP QD  Repeat Hepatitis panel  Encourage alcohol cessation

## 2023-12-29 NOTE — ASSESSMENT & PLAN NOTE
"Patient reports 6-9 months of chronic alcohol use. 1 750ml bottle of vodka + several beers daily. Reports drinking a \"large glass of vodka\" at 0230 12/28/23. Presented to ED at the recommendation of wife and AA peer. Presentation to ED with ethanol of 181 at 0800. Received multivitamin, thiamine, folic acid in the ED. Symptoms upon admission to unit include moderate anxiety, mild agitation, confusion, diaphoresis, mild tremulousness.     12/29/23: Total phenobarbital 910mg, mild tremulousness, fatigue, weakness.  Continue SEWS protocol for one more day.  Thiamine 100mg PO QD, Folic acid 1mg PO QD, Multivitamin PO QD.  "

## 2023-12-29 NOTE — ASSESSMENT & PLAN NOTE
Patient endorses history of alcohol abuse starting from childhood.   Endorses occasional cocaine and THC use throughout this time. Became sober in 2017. Detox and 90-day inpatient rehab treatment, followed by sober living residence before returning home. Has been committed to daily AA meetings and active engagement in recovery for the past 5 years. Stopped attending peer recovery meetings and relapsed on alcohol 9 months ago.   Has been consuming 750ml of vodka and 4-5 beers daily for the past 6 months.   Not interested in naltrexone as medication was intolerable to patient during previous inpatient D/A treatment. Recovery support system at home with history of sustained sobriety.  Continue folic acid and thiamine supplementation   Case Management Consulted for aftercare planning.

## 2023-12-29 NOTE — SOCIAL WORK
SUBSTANCE ABUSE    Stressor/Trigger               Alcohol Use Disorder   UDS: + THC +Jeannine  Audit: 41  PAWSS: 4          Nicotine: 2 Packs a Day  Pt declined treatment resources.   Ethanol: 181   Prior D&A treatment    Pt reports prior D&A history of [facility] and two sober living facilities.    AA/NA Meetings   Pt reports attending AA meetings daily.    Withdrawal  Symptoms    Pt reports withdrawal symptoms of blurred vision, anxiety, shaking, unsteady legs, and nausea.    History of OD/blackouts or Withdrawal Seizures    None reported.    MENTAL HEALTH INFORMATION    Hx of Mental Health Dx    Pt denies.    Outpatient Mental Health Tx    Pt denies.    Inpatient Hospitalizations (Mental Health)    Pt denies.    Family History of Mental Health     Pt reports LISSETTE and AUD in family history on paternal side.    Trauma History     Pt denies.    Current MH Symptoms    Pt denies SI and HI. Pt reports AVH of hearing a radio in the corner of his room. Pt no longer has AVH.    Access to Firearms         Pt denies.    PHYSICAL HEALTH INFORMATION    Physical Health Conditions (Chronic)    Hepatitis C   PCP   Pt does not report PCP.    Insurance    [employer] ([phone])   Preferred Pharmacy    Pt declines preferred pharmacy.    LEGAL ISSUES    Past or present legal issues    Pt reports past legal issues. Pt denies current legal issues.    Probation/[place]   Pt denies.    EMPLOYMENT/INCOME/NEEDS    Education    Pt reports graduating high school and completing trade school.    Employment  Pt is currently unemployed.       History    Pt denies.   Spiritual/Mormonism Beliefs   Sabianist    Transportation/Transport Home   Pt reports others transport or utilizes public transportation.    DEMOGRAPHICS    Discharge Address   [address] [place] [id number]   Living Arrangement    Pt lives in a home with wife's sister, wife's sisters , and adult child.    Can pt return home  Yes     External Supports                             Sabi Ferrell   Phone Number   [phone]   Email      Marital Status/Children   Pt reports two biological children, ages 31 and 16 years old.      Substance First use Last Use Frequency Amount Used How long Longest period of sobriety and when Method of use   THC            Heroin            Cocaine            ETOH   13 12/28/23 Daily 750 ML Vodka and 4-5 Beers  5 Years Drink   Meth            Benzos            Other:  Tobacco      Daily  2 Packs A Day

## 2023-12-29 NOTE — ASSESSMENT & PLAN NOTE
Lab Results   Component Value Date    ALT 95 (H) 12/29/2023    AST 75 (H) 12/29/2023    ALKPHOS 35 12/29/2023    TBILI 1.13 (H) 12/29/2023        Elevated LFTs on admission, likely 2/2 chronic alcohol use/alcoholic liver disease.   History of chronic Hep C, s/p 8 week treatment of Mavyrick with undetectable viral level at 4 weeks. Did not complete recommended 12, 24 week level, but without cirrhosis on U/S at that time.   Pt endorses abdominal pain, and has moderate tenderness upon palpation of the RUQ. RUQ tenderness started several months ago. No IV drug or cocaine use during past year.  RUQ U/S with mild steatosis. Patient reports improving RUQ, no longer tender to palpation.  CMP QD  Repeat Hepatitis panel- Reactive Hep C Antibody  Recommend outpatient GI follow up upon discharge  Encourage alcohol cessation

## 2023-12-29 NOTE — PROGRESS NOTES
12/29/23 1046   Referral Data   Referral Source Patient   Referral Name Pt presented to  ED   Referral Reason Drug/Alcohol Abuse   County Information   County of Residence [place]   Readmission Root Cause   30 Day Readmission No   Patient Information   Mental Status Alert   Primary Caregiver Self   Support System Immediate family;Other (Comment)  (Wife sister's family)   [employer]/Cultural Requests [facility]   Legal Information   Legal Issues Pt denied   Activities of Daily Living Prior to Admission   Functional Status Independent   Assistive Device No device needed   Living Arrangement House   Ambulation Independent   Access to Firearms   Access to Firearms No  (Pt denied)   Income Information   Income Source Unemployed   Means of Transportation   Means of Transport to Appts: Public Transportation - Bus  (Friends)          12/29/23 1049   Substance Abuse Addendum Details   History of Withdrawal Symptoms Other withdrawal symptoms (specify in comment)  (blurred vision, tremors, anxiety, nausea, unsteady balance)   Medical Complications Hep C   Sober Supports Sabi (Sister In law)   Present Treatment Pt denied any present treatment   Substance Abuse Treatment Hx Past Tx, Inpatient;Past detox;Attends AA/NA   Stage of Change   Stage of Change Precontemplation       Additional Substance Use Detail    Questions Responses   Problems Due to Past Use of Alcohol? Yes   Problems Due to Past Use of Substances? No   Substance Use Assessment Denies substance use within the past 12 months   Alcohol Use Frequency Daily   Alcohol Drink of Choice vodka   1st Use of Alcohol 13   Last Use of Alcohol & Amount 12/28/23- 750 mL vodka bottle, 4-5 beers   Longest Abstinence from Alcohol 5 years   Cocaine method IV         Social Determinants of Health     Tobacco Use: 1PPD, Pt declined smoking cessation treatment or resources. Pt reported he is not interested in stopping smoking at this time.    Alcohol Use: Pt reported he is drinking 750  mL of vodka and 4-5 beers daily for 9 months.   AUDIT SCORE: Discussed with patient: AUDIT score of 41 UDS/Identified Substance(s) used: Alcohol   Risks discussed included: Housing, relationships, physical health.   Recommendations discussed: CM recommended Pt to go IP for LISSETTE Tx.   Patient's response: Pt declined and reported he wants to continue AA meetings.     Financial Resource Strain: Pt denied.     Food Insecurity: Pt declined and reported he has access to food.     Transportation Needs: Pt reported he utilizes public transportation or friends.     Intimate Partner Violence: Pt denied.     Depression: Pt denied HI and SI.    Housing Stability: Pt lives in a house and did not identify any housing needs. Pt reported he is able to return home.     Utilities: Pt denied.     Health Literacy: Pt is aware and up to date on his health status.     Pt is a 58 year old male who was admitted to withdrawal management unit for alcohol withdrawal. Pt had presented to [facility] ED. Pt's name, date of birth, home address and telephone number were verified. Pt was informed of case management role and the purpose of the completion of intake with case management. Pt presented as cooperative.   Pt presented in the pre-contemplation stage of change. When Cm educated Pt on the levels of care. Pt reported he does not want any additional treatment and only wants to do AA meetings. Pt's goals for detox are to successfully complete medical withdrawal and to develop a discharge plan that includes relapse prevention education.         SUBSTANCE ABUSE     Stressor/Trigger               Alcohol Use Disorder   UDS: + THC +Jeannine  Audit: 41  PAWSS: 4          Nicotine: 2 Packs a Day  Pt declined treatment resources.   Ethanol: 181   Prior D&A treatment     Pt reports prior D&A history of [facility] and two sober living facilities.    AA/NA Meetings    Pt reports attending AA meetings daily.    Withdrawal  Symptoms     Pt reports withdrawal  symptoms of blurred vision, anxiety, shaking, unsteady legs, and nausea.    History of OD/blackouts or Withdrawal Seizures     None reported.    MENTAL HEALTH INFORMATION     Hx of Mental Health Dx     Pt denies.    Outpatient Mental Health Tx     Pt denies.    Inpatient Hospitalizations (Mental Health)     Pt denies.    Family History of Mental Health      Pt reports LISSETTE and AUD in family history on paternal side.    Trauma History      Pt denies.    Current MH Symptoms     Pt denies SI and HI. Pt reports AVH of hearing a radio in the corner of his room. Pt no longer has AVH.    Access to Firearms          Pt denies.    PHYSICAL HEALTH INFORMATION     Physical Health Conditions (Chronic)     Hepatitis C   PCP    Pt does not report PCP.    Insurance     [employer] ([phone])   Preferred Pharmacy     Pt declines preferred pharmacy.    LEGAL ISSUES     Past or present legal issues     Pt reports past legal issues. Pt denies current legal issues.    Probation/[place]    Pt denies.    EMPLOYMENT/INCOME/NEEDS     Education     Pt reports graduating high school and completing trade school.    Employment  Pt is currently unemployed.        History     Pt denies.   Spiritual/Protestant Beliefs    Rastafarian    Transportation/Transport Home    Pt reports others transport or utilizes public transportation.    DEMOGRAPHICS     Discharge Address    [address] [address]   Living Arrangement     Pt lives in a home with wife's sister, wife's sisters , and adult child.    Can pt return home  Yes      External Supports                            Sabi Ferrell   Phone Number    [phone]   Email        Marital Status/Children    Pt reports two biological children, ages 31 and 16 years old.       Substance First use Last Use Frequency Amount Used How long Longest period of sobriety and when Method of use   THC                    Heroin                    Cocaine                    ETOH    13  12/28/23 Daily 750 ML Vodka and 4-5 Beers   5 Years Drink   Meth                    Benzos                    Other:  Tobacco         Daily  2 Packs A Day

## 2023-12-29 NOTE — ASSESSMENT & PLAN NOTE
Patient reports longstanding GERD and taking prilosec 20mg QD over the counter. Endorsing acid-reflux symptoms with meals.  Omeprazole 20mg QD.

## 2023-12-30 PROBLEM — F10.939 ALCOHOL WITHDRAWAL SYNDROME WITH COMPLICATION (HCC): Status: RESOLVED | Noted: 2017-04-02 | Resolved: 2023-12-30

## 2023-12-30 LAB
ANION GAP SERPL CALCULATED.3IONS-SCNC: 13 MMOL/L
BUN SERPL-MCNC: 9 MG/DL (ref 5–25)
CALCIUM SERPL-MCNC: 9.1 MG/DL (ref 8.4–10.2)
CHLORIDE SERPL-SCNC: 100 MMOL/L (ref 96–108)
CO2 SERPL-SCNC: 23 MMOL/L (ref 21–32)
CREAT SERPL-MCNC: 0.69 MG/DL (ref 0.6–1.3)
ERYTHROCYTE [DISTWIDTH] IN BLOOD BY AUTOMATED COUNT: 13.1 % (ref 11.6–15.1)
GFR SERPL CREATININE-BSD FRML MDRD: 103 ML/MIN/1.73SQ M
GLUCOSE SERPL-MCNC: 88 MG/DL (ref 65–140)
HCT VFR BLD AUTO: 49.8 % (ref 36.5–49.3)
HGB BLD-MCNC: 16.8 G/DL (ref 12–17)
MCH RBC QN AUTO: 32.4 PG (ref 26.8–34.3)
MCHC RBC AUTO-ENTMCNC: 33.7 G/DL (ref 31.4–37.4)
MCV RBC AUTO: 96 FL (ref 82–98)
PLATELET # BLD AUTO: 84 THOUSANDS/UL (ref 149–390)
PMV BLD AUTO: 12.3 FL (ref 8.9–12.7)
POTASSIUM SERPL-SCNC: 3.5 MMOL/L (ref 3.5–5.3)
RBC # BLD AUTO: 5.19 MILLION/UL (ref 3.88–5.62)
SODIUM SERPL-SCNC: 136 MMOL/L (ref 135–147)
WBC # BLD AUTO: 6.21 THOUSAND/UL (ref 4.31–10.16)

## 2023-12-30 PROCEDURE — 85027 COMPLETE CBC AUTOMATED: CPT | Performed by: EMERGENCY MEDICINE

## 2023-12-30 PROCEDURE — 80048 BASIC METABOLIC PNL TOTAL CA: CPT | Performed by: EMERGENCY MEDICINE

## 2023-12-30 PROCEDURE — 99232 SBSQ HOSP IP/OBS MODERATE 35: CPT

## 2023-12-30 RX ADMIN — MULTIPLE VITAMINS W/ MINERALS TAB 1 TABLET: TAB ORAL at 08:41

## 2023-12-30 RX ADMIN — FOLIC ACID 1 MG: 1 TABLET ORAL at 08:41

## 2023-12-30 RX ADMIN — PANTOPRAZOLE SODIUM 20 MG: 20 TABLET, DELAYED RELEASE ORAL at 05:24

## 2023-12-30 RX ADMIN — THIAMINE HCL TAB 100 MG 100 MG: 100 TAB at 08:41

## 2023-12-30 NOTE — PROGRESS NOTES
"  Progress Note - Medical Toxicology    Steve Dunham 58 y.o. male MRN: [id number]  Unit/Bed#: 5T DETOX 511-01 Encounter: [id number]  MEDICAL DETOX UNIT, LEVEL 4  Department of Medical Toxicology  Reason for Admission/Principal Problem: Alcohol withdrawal   Rounding Provider: Nelda Tillman PA-C, Nishant Gonzalez, DO         * Alcohol withdrawal syndrome with complication (HCC)  Assessment & Plan  Patient reports 6-9 months of chronic alcohol use. 1 750ml bottle of vodka + several beers daily. Reports drinking a \"large glass of vodka\" at 0230 12/28/23. Presented to ED at the recommendation of wife and AA peer. Presentation to ED with ethanol of 181 at 0800.   Discontinue SEWS protocol  Received a total of 1040 mg of phenobarbital. Last Dose Administered on 12/29/23 at 1133  Will monitor off of protocol to ensure complete resolution of withdrawal symptoms.     Alcohol use disorder, severe, dependence (HCC)  Assessment & Plan  Patient endorses history of alcohol abuse starting from childhood.   Endorses occasional cocaine and THC use throughout this time. Became sober in 2017. Detox and 90-day inpatient rehab treatment, followed by sober living residence before returning home. Has been committed to daily AA meetings and active engagement in recovery for the past 5 years. Stopped attending peer recovery meetings and relapsed on alcohol 9 months ago.   Has been consuming 750ml of vodka and 4-5 beers daily for the past 6 months.   Not interested in naltrexone as medication was intolerable to patient during previous inpatient D/A treatment. Recovery support system at home with history of sustained sobriety.  Continue folic acid and thiamine supplementation   Case Management Consulted for aftercare planning.     GERD (gastroesophageal reflux disease)  Assessment & Plan  Patient reports longstanding GERD and taking prilosec 20mg QD over the counter. Endorsing acid-reflux symptoms with meals.  Omeprazole 20mg " QD.        Transaminitis  Assessment & Plan  Lab Results   Component Value Date    ALT 95 (H) 12/29/2023    AST 75 (H) 12/29/2023    ALKPHOS 35 12/29/2023    TBILI 1.13 (H) 12/29/2023        Elevated LFTs on admission, likely 2/2 chronic alcohol use/alcoholic liver disease.   History of chronic Hep C, s/p 8 week treatment of Mavyrick with undetectable viral level at 4 weeks. Did not complete recommended 12, 24 week level, but without cirrhosis on U/S at that time.   Pt endorses abdominal pain, and has moderate tenderness upon palpation of the RUQ. RUQ tenderness started several months ago. No IV drug or cocaine use during past year.  RUQ U/S with mild steatosis. Patient reports improving RUQ, no longer tender to palpation.  CMP QD  Repeat Hepatitis panel- Reactive Hep C Antibody  Recommend outpatient GI follow up upon discharge  Encourage alcohol cessation     Nicotine dependence  Assessment & Plan  I personally provided tobacco cessation education greater than 7 minutes. Patient currently smokes 2 PPD.  NRT            VTE Pharmacologic Prophylaxis:   Pharmacologic: Pharmacologic VTE Prophylaxis contraindicated due to thrombocytopenia   Mechanical VTE Prophylaxis in Place: no    Code Status: Level 1 - Full Code    Patient Centered Rounds: I have performed bedside rounds with nursing staff today.    Discussions with Specialists or Other Care Team Provider: Case Management, Nursing   Education and Discussions with Family / Patient: I personally answered all of the patient's questions to the best of my ability     Time Spent for Care: 30 minutes.  More than 50% of total time spent on counseling and coordination of care as described above.    Current Length of Stay: 2 day(s)    Current Patient Status: Inpatient     Certification Statement: The patient will continue to require additional inpatient hospital stay due to monitoring off of protocol  Discharge Plan: Anticipated Discharge to home in 24 hours      Subjective:  "  Patient seen and examined at bedside. Denies further withdrawal symptoms. Does report to feeling \"foggy\" this morning possible side effect from phenobarbital. Notes improvement in his coordination- has walked around the unit independently but notes he does not feel at his baseline.     Objective:     Clinical Opiate Withdrawal Scale  Pulse: 88    SEWS Total Score: 2 (2023  3:00 PM)        Last 24 Hours Medication List:   Current Facility-Administered Medications   Medication Dose Route Frequency Provider Last Rate    acetaminophen  650 mg Oral Q6H PRN Nelda Tillman PA-C      folic acid  1 mg Oral Daily Mk Mcdowell MD      melatonin  6 mg Oral HS PRN Isadora Lewis PA-C      multivitamin-minerals  1 tablet Oral Daily Tor Camp MD      ondansetron  4 mg Intravenous Q6H PRN Nelda Tillman PA-C      pantoprazole  20 mg Oral Early Morning Isadora Lewis PA-C      thiamine  100 mg Oral Daily Mk Mcdowell MD      traZODone  50 mg Oral HS PRN Nelda Tillman PA-C           Vitals:   Temp (24hrs), Av.6 °F (36.4 °C), Min:97.6 °F (36.4 °C), Max:97.8 °F (36.6 °C)    Temp:  [97.6 °F (36.4 °C)-97.8 °F (36.6 °C)] 97.8 °F (36.6 °C)  HR:  [78-88] 88  Resp:  [18] 18  BP: (126-153)/() 126/105  SpO2:  [94 %-96 %] 96 %  Body mass index is 25.53 kg/m².     Input and Output Summary (last 24 hours):No intake or output data in the 24 hours ending 23 0911    Physical Exam:   Physical Exam  Vitals and nursing note reviewed.   Constitutional:       General: He is not in acute distress.     Appearance: He is not diaphoretic.   Eyes:      General: No scleral icterus.     Pupils: Pupils are equal, round, and reactive to light.   Cardiovascular:      Rate and Rhythm: Normal rate and regular rhythm.      Heart sounds: No murmur heard.  Pulmonary:      Breath sounds: Normal breath sounds.   Abdominal:      General: Bowel sounds are normal. There is no distension.      " Palpations: Abdomen is soft.   Neurological:      Mental Status: He is alert and oriented to person, place, and time.      Motor: No tremor.      Coordination: Coordination is intact.   Psychiatric:         Mood and Affect: Mood is not anxious or depressed.         Additional Data:     Labs: keep all most recent labs as listed on admission templates   Results from last 7 days   Lab Units 12/30/23  0523 12/29/23  0549 12/28/23  0811   WBC Thousand/uL 6.21   < > 4.50   HEMOGLOBIN g/dL 16.8   < > 16.5   HEMATOCRIT % 49.8*   < > 47.0   PLATELETS Thousands/uL 84*   < > 115*   NEUTROS PCT %  --   --  63   LYMPHS PCT %  --   --  26   MONOS PCT %  --   --  9   EOS PCT %  --   --  1    < > = values in this interval not displayed.      Results from last 7 days   Lab Units 12/30/23  0523 12/29/23  0549   SODIUM mmol/L 136 135   POTASSIUM mmol/L 3.5 3.5   CHLORIDE mmol/L 100 99   CO2 mmol/L 23 24   BUN mg/dL 9 11   CREATININE mg/dL 0.69 0.63   ANION GAP mmol/L 13 12   CALCIUM mg/dL 9.1 8.2*   ALBUMIN g/dL  --  4.1   TOTAL BILIRUBIN mg/dL  --  1.13*   ALK PHOS U/L  --  35   ALT U/L  --  95*   AST U/L  --  75*   GLUCOSE RANDOM mg/dL 88 76                              * I Have Reviewed All Lab Data Listed Above.  * Additional Pertinent Lab Tests Reviewed: All Labs For Current Hospital Admission Reviewed      Imaging Studies: I have personally reviewed pertinent reports.        Recent Cultures (last 7 days):          Today, Patient Was Seen By: Nelda Tillman PA-C    ** Please Note: Dictation voice to text software may have been used in the creation of this document. **

## 2023-12-30 NOTE — DISCHARGE SUMMARY
MEDICAL DETOX UNIT, LEVEL 4  Department of Medical Toxicology  Reason for Admission/Principal Problem: Alcohol withdrawal   Admitting provider: LILIANA Harp DO   12/28/2023  7:39 AM       Discharging Physician / Practitioner: Lynnette Lewis PA-C  PCP: No primary care provider on file.  Admission Date:   Admission Orders (From admission, onward)       Ordered        12/28/23 0842  INPATIENT ADMISSION  Once                          Discharge Date: 12/31/23    Medical Problems       Resolved Problems  Date Reviewed: 4/3/2017            Resolved    * (Principal) Alcohol withdrawal syndrome with complication (HCC) 12/30/2023     Resolved by  Isadora Lewis PA-C          GERD (gastroesophageal reflux disease)  Assessment & Plan  Patient reports longstanding GERD and taking prilosec 20mg QD over the counter. Endorsing acid-reflux symptoms with meals.  Omeprazole 20mg QD.        Alcohol use disorder, severe, dependence (HCC)  Assessment & Plan  Patient endorses history of alcohol abuse starting from childhood.   Endorses occasional cocaine and THC use throughout this time. Became sober in 2017. Detox and 90-day inpatient rehab treatment, followed by sober living residence before returning home. Has been committed to daily AA meetings and active engagement in recovery for the past 5 years. Stopped attending peer recovery meetings and relapsed on alcohol 9 months ago.   Has been consuming 750ml of vodka and 4-5 beers daily for the past 6 months.     Not interested in naltrexone as medication was intolerable to patient during previous inpatient D/A treatment. Recovery support system at home with history of sustained sobriety.  Continue folic acid and thiamine supplementation   Case Management Consulted for aftercare planning.     Transaminitis  Assessment & Plan  Lab Results   Component Value Date    ALT 95 (H) 12/29/2023    AST 75 (H) 12/29/2023    ALKPHOS 35 12/29/2023    TBILI 1.13 (H)  "12/29/2023        Elevated LFTs on admission, likely 2/2 chronic alcohol use/alcoholic liver disease.   History of chronic Hep C, s/p 8 week treatment of Mavyrick with undetectable viral level at 4 weeks. Did not complete recommended 12, 24 week level, but without cirrhosis on U/S at that time.   Pt endorses abdominal pain, and has moderate tenderness upon palpation of the RUQ. RUQ tenderness started several months ago. No IV drug or cocaine use during past year.  RUQ U/S with mild steatosis. Patient reports improving RUQ, no longer tender to palpation.  Repeat Hepatitis panel- Reactive Hep C Antibody  Recommend outpatient GI follow up upon discharge  Encourage alcohol cessation     Nicotine dependence  Assessment & Plan  I personally provided tobacco cessation education greater than 7 minutes. Patient currently smokes 2 PPD.  NRT    * Alcohol withdrawal syndrome with complication (HCC)-resolved as of 12/30/2023  Assessment & Plan  Patient reports 6-9 months of chronic alcohol use. 1 750ml bottle of vodka + several beers daily. Reports drinking a \"large glass of vodka\" at 0230 12/28/23. Presented to ED at the recommendation of wife and AA peer. Presentation to ED with ethanol of 181 at 0800.   Discontinue SEWS protocol  Received a total of 1040 mg of phenobarbital. Last Dose Administered on 12/29/23 at 1133        Consultations During Hospital Stay:  Case Management     Procedures Performed:   None    Significant Findings / Test Results:   Transaminitis- improving   RUQ U/S- \"Mild hepatic steatosis. Remainder of the examination is normal.\"    Incidental Findings:   None     Test Results Pending at Discharge (will require follow up):   None     Outpatient Tests/Follow ups Requested:  PCP follow up     Complications:  None    Reason for Admission: Alcohol withdrawal     Hospital Course:     Steve Dunham is a 58 y.o. male patient who originally presented to the hospital on 12/28/2023 due to alcohol withdrawal. " "Patient initially presented to the [facility] ED requesting detoxification from alcohol. [facility] medically assisted alcohol withdrawal and received a total of 1040 mg phenobarbital without complication, with last dose of phenobarbital administered 1115 on 12/29. Patient's alcohol withdrawal symptoms subsequently resolved, and he has remained without objective evidence of alcohol withdrawal at this time.  Case management was consulted for assistance with aftercare resources, and patient will be discharged to home with outpatient resources.      Please see above list of diagnoses and related plan for additional information.     Condition at Discharge: good     Discharge Day Visit / Exam:     Subjective:  Patient without acute complaints this morning. He reports improvement in the \"fogginess\" he felt yesterday and feels ready for discharge.  All questions answered to patient's satisfaction.  Vitals: Blood Pressure: 132/74 (12/31/23 0347)  Pulse: 60 (12/31/23 0347)  Temperature: 97.6 °F (36.4 °C) (12/31/23 0347)  Temp Source: Temporal (12/31/23 0347)  Respirations: 18 (12/31/23 0347)  Height: 5' 7\" (170.2 cm) (12/28/23 1000)  Weight - Scale: 73.9 kg (163 lb) (12/28/23 1000)  SpO2: 97 % (12/31/23 0347)  Exam:   Physical Exam  Constitutional:       General: He is not in acute distress.     Appearance: He is not diaphoretic.   Eyes:      Pupils: Pupils are equal, round, and reactive to light.   Cardiovascular:      Rate and Rhythm: Normal rate and regular rhythm.      Pulses: Normal pulses.      Heart sounds: Normal heart sounds. No murmur heard.     No gallop.   Pulmonary:      Effort: Pulmonary effort is normal. No respiratory distress.      Breath sounds: Normal breath sounds. No wheezing or rales.   Abdominal:      General: Abdomen is flat. Bowel sounds are normal. There is no distension.      Palpations: Abdomen is soft.      Tenderness: There is no " abdominal tenderness. There is no guarding.   Skin:     General: Skin is warm and dry.   Neurological:      Mental Status: He is alert. Mental status is at baseline.   Psychiatric:         Mood and Affect: Mood normal.         Behavior: Behavior normal.       Discussion with Family: Spoke to patient regarding treatment plan     Discharge instructions/Information to patient and family:   See after visit summary for information provided to patient and family.      Provisions for Follow-Up Care:  See after visit summary for information related to follow-up care and any pertinent home health orders.      Disposition:     Home    For Discharges to [facility]:   Not Applicable to this Patient - Not Applicable to this Patient    Planned Readmission: None     Discharge Statement:  I spent 30 minutes discharging the patient. This time was spent on the day of discharge. I had direct contact with the patient on the day of discharge. Greater than 50% of the total time was spent examining patient, answering all patient questions, arranging and discussing plan of care with patient as well as directly providing post-discharge instructions.  Additional time then spent on discharge activities.    Discharge Medications:  See after visit summary for reconciled discharge medications provided to patient and family.      ** Please Note: This note has been constructed using a voice recognition system **

## 2023-12-30 NOTE — DISCHARGE INSTR - OTHER ORDERS
CRISIS INFORMATION  If you are experiencing a mental health emergency, you may call the [facility] Crisis Intervention Office 24 hours a day, 7 days per week at [phone].    In [facility], call [phone].    [facility] is a confidential 24/7 telephone support service manned by trained mental health consumers.  [facility] provides support, a listening ear and can provide information about available services.[facility] specializes in the concerns of mental health consumers, their families and friends.  However, we are also here for anyone who has a mental health concern, is confused about or just doesn't know anything about mental health or where to get information.  To reach [facility], call [phone].    HOW TO GET SUBSTANCE ABUSE HELP:  If you or someone you know has a drug or alcohol problem, there is help:  [facility] Drug & Alcohol Abuse Services: [phone]  [facility] Drug & Alcohol Abuse Services: [phone]  An assessment is the first step.   In addition to those listed there are other programs available in the area but assessment is best to determine an appropriate level of care.  If you DO NOT have Medical Assistance (MA) or Private Insurance, an assessment can be scheduled at one of these providers:  [facility]  [address] [place] [address]  [phone]   [facility]  [address] [place] [address]  [phone]   [address] [place] [address]  [phone]   [facility]  [address] [place] [address]  [phone]   [facility]  [address] [place] [address]  [phone]   [facility]  [address] [place] [address]  [phone]   [facility]  [address], [address], [place] [address]  [phone]     If you HAVE Medical Assistance, an assessment can be scheduled at one of these providers:  [place] [facility]  [address] [place] [address]  [phone]   [facility]  [address] [place] [address]  [phone]   [facility]  [address], [facility] [place] [address]  [phone]  [address] [place] [address], [address], [place] [address] [phone]   [address] [address]  [phone]   [address] [place] [address]  [phone]   [facility] ([facility])  [address] [place] [address]  [phone]   [facility]  [address] [place] [address]  [phone]   [facility]  [address] [place] [address]  [phone]   [facility]  [address] [place] [address]  [phone]   [facility]  [address], [place] [address]  [phone]     If you HAVE Private Insurance, an assessment can be scheduled at one of these providers.  Please contact these Providers to determine if they are in your network plan:  [facility]  [address], [place] [address]  [phone]   [address] [address]  [phone]   [address] [place] [address]  [phone]   NET ([facility])  [address] [place] [address]  [phone]   [facility]  [address] [place] [address]  [phone]   [facility]  [address], [place] [address]  [phone]

## 2023-12-31 VITALS
RESPIRATION RATE: 18 BRPM | DIASTOLIC BLOOD PRESSURE: 75 MMHG | SYSTOLIC BLOOD PRESSURE: 132 MMHG | HEIGHT: 67 IN | BODY MASS INDEX: 25.58 KG/M2 | WEIGHT: 163 LBS | OXYGEN SATURATION: 98 % | HEART RATE: 70 BPM | TEMPERATURE: 97.9 F

## 2023-12-31 PROCEDURE — 99238 HOSP IP/OBS DSCHRG MGMT 30/<: CPT

## 2023-12-31 RX ADMIN — PANTOPRAZOLE SODIUM 20 MG: 20 TABLET, DELAYED RELEASE ORAL at 06:00

## 2023-12-31 RX ADMIN — THIAMINE HCL TAB 100 MG 100 MG: 100 TAB at 08:15

## 2023-12-31 RX ADMIN — MULTIPLE VITAMINS W/ MINERALS TAB 1 TABLET: TAB ORAL at 08:15

## 2023-12-31 RX ADMIN — ACETAMINOPHEN 650 MG: 325 TABLET ORAL at 03:52

## 2023-12-31 RX ADMIN — FOLIC ACID 1 MG: 1 TABLET ORAL at 08:15

## 2023-12-31 NOTE — ASSESSMENT & PLAN NOTE
Lab Results   Component Value Date    ALT 95 (H) 12/29/2023    AST 75 (H) 12/29/2023    ALKPHOS 35 12/29/2023    TBILI 1.13 (H) 12/29/2023        Elevated LFTs on admission, likely 2/2 chronic alcohol use/alcoholic liver disease.   History of chronic Hep C, s/p 8 week treatment of Mavyrick with undetectable viral level at 4 weeks. Did not complete recommended 12, 24 week level, but without cirrhosis on U/S at that time.   Pt endorses abdominal pain, and has moderate tenderness upon palpation of the RUQ. RUQ tenderness started several months ago. No IV drug or cocaine use during past year.  RUQ U/S with mild steatosis. Patient reports improving RUQ, no longer tender to palpation.  Repeat Hepatitis panel- Reactive Hep C Antibody  Recommend outpatient GI follow up upon discharge  Encourage alcohol cessation

## 2023-12-31 NOTE — NURSING NOTE
Patient discharged to home. Belongings accounted for. AVS reviewed with patient, questions answered and understanding stated. Patient ambulated to lobby with RN.

## 2023-12-31 NOTE — NURSING NOTE
Smoking cessation information print out reviewed and given to the patient at this time.   Patient not ready to quit smoking.

## 2023-12-31 NOTE — ASSESSMENT & PLAN NOTE
"Patient reports 6-9 months of chronic alcohol use. 1 750ml bottle of vodka + several beers daily. Reports drinking a \"large glass of vodka\" at 0230 12/28/23. Presented to ED at the recommendation of wife and AA peer. Presentation to ED with ethanol of 181 at 0800.   Discontinue SEWS protocol  Received a total of 1040 mg of phenobarbital. Last Dose Administered on 12/29/23 at 1133  "

## 2024-01-03 NOTE — UTILIZATION REVIEW
NOTIFICATION OF ADMISSION DISCHARGE   This is a Notification of Discharge from [facility]. Please be advised that this patient has been discharge from our facility. Below you will find the admission and discharge date and time including the patient’s disposition.   UTILIZATION REVIEW CONTACT:  Polina Ontiveros MA  Utilization   Network Utilization Review Department  Phone: [phone] x carefully listen to the prompts. All voicemails are confidential.  Email: [email or web address]     ADMISSION INFORMATION  PRESENTATION DATE: 12/28/2023  7:39 AM  OBERVATION ADMISSION DATE:   INPATIENT ADMISSION DATE: 12/28/23  8:42 AM   DISCHARGE DATE: 12/31/2023 10:36 AM   DISPOSITION:Home/Self Care    Network Utilization Review Department  ATTENTION: Please call with any questions or concerns to [phone] and carefully listen to the prompts so that you are directed to the right person. All voicemails are confidential.   For Discharge needs, contact Care Management DC Support Team at [phone] opt. 2  Send all requests for admission clinical reviews, approved or denied determinations and any other requests to dedicated fax number below belonging to the campus where the patient is receiving treatment. List of dedicated fax numbers for the Facilities:  FACILITY NAME UR FAX NUMBER   ADMISSION DENIALS (Administrative/Medical Necessity) [phone]   DISCHARGE SUPPORT TEAM ([facility]) [phone]   PARENT CHILD HEALTH (Maternity/NICU/Pediatrics) [phone]   [facility] [phone]   [facility] [phone]   [facility] [phone]   [facility] [phone]   [facility] [phone]   [facility] [phone]   [facility] [phone]   [facility]  [phone]   [facility] [phone]   [facility] [phone]   [facility] [phone]

## 2024-10-05 ENCOUNTER — HOSPITAL ENCOUNTER (INPATIENT)
Facility: HOSPITAL | Age: 59
LOS: 3 days | Discharge: HOME | End: 2024-10-08
Attending: EMERGENCY MEDICINE | Admitting: INTERNAL MEDICINE
Payer: COMMERCIAL

## 2024-10-05 DIAGNOSIS — K21.9 GASTROESOPHAGEAL REFLUX DISEASE, UNSPECIFIED WHETHER ESOPHAGITIS PRESENT: ICD-10-CM

## 2024-10-05 DIAGNOSIS — F17.200 NICOTINE DEPENDENCE: ICD-10-CM

## 2024-10-05 DIAGNOSIS — F10.20 ALCOHOL USE DISORDER, SEVERE, DEPENDENCE (HCC): ICD-10-CM

## 2024-10-05 DIAGNOSIS — F10.939 ALCOHOL WITHDRAWAL (HCC): ICD-10-CM

## 2024-10-05 DIAGNOSIS — F10.10 ALCOHOL ABUSE: Primary | ICD-10-CM

## 2024-10-05 PROBLEM — E87.6 HYPOKALEMIA: Status: ACTIVE | Noted: 2024-10-05

## 2024-10-05 LAB
ALBUMIN SERPL BCG-MCNC: 4.6 G/DL (ref 3.5–5)
ALP SERPL-CCNC: 49 U/L (ref 34–104)
ALT SERPL W P-5'-P-CCNC: 21 U/L (ref 7–52)
ANION GAP SERPL CALCULATED.3IONS-SCNC: 11 MMOL/L (ref 4–13)
AST SERPL W P-5'-P-CCNC: 44 U/L (ref 13–39)
BASOPHILS # BLD AUTO: 0.06 THOUSANDS/ΜL (ref 0–0.1)
BASOPHILS NFR BLD AUTO: 1 % (ref 0–1)
BILIRUB SERPL-MCNC: 0.52 MG/DL (ref 0.2–1)
BUN SERPL-MCNC: 10 MG/DL (ref 5–25)
CALCIUM SERPL-MCNC: 9.6 MG/DL (ref 8.4–10.2)
CHLORIDE SERPL-SCNC: 97 MMOL/L (ref 96–108)
CO2 SERPL-SCNC: 26 MMOL/L (ref 21–32)
CREAT SERPL-MCNC: 0.82 MG/DL (ref 0.6–1.3)
EOSINOPHIL # BLD AUTO: 0.04 THOUSAND/ΜL (ref 0–0.61)
EOSINOPHIL NFR BLD AUTO: 0 % (ref 0–6)
ERYTHROCYTE [DISTWIDTH] IN BLOOD BY AUTOMATED COUNT: 12.6 % (ref 11.6–15.1)
ETHANOL SERPL-MCNC: 237 MG/DL
GFR SERPL CREATININE-BSD FRML MDRD: 96 ML/MIN/1.73SQ M
GLUCOSE SERPL-MCNC: 93 MG/DL (ref 65–140)
HCT VFR BLD AUTO: 48.5 % (ref 36.5–49.3)
HGB BLD-MCNC: 16.9 G/DL (ref 12–17)
IMM GRANULOCYTES # BLD AUTO: 0.03 THOUSAND/UL (ref 0–0.2)
IMM GRANULOCYTES NFR BLD AUTO: 0 % (ref 0–2)
LIPASE SERPL-CCNC: 41 U/L (ref 11–82)
LYMPHOCYTES # BLD AUTO: 2.71 THOUSANDS/ΜL (ref 0.6–4.47)
LYMPHOCYTES NFR BLD AUTO: 23 % (ref 14–44)
MAGNESIUM SERPL-MCNC: 2 MG/DL (ref 1.9–2.7)
MCH RBC QN AUTO: 33.1 PG (ref 26.8–34.3)
MCHC RBC AUTO-ENTMCNC: 34.8 G/DL (ref 31.4–37.4)
MCV RBC AUTO: 95 FL (ref 82–98)
MONOCYTES # BLD AUTO: 0.8 THOUSAND/ΜL (ref 0.17–1.22)
MONOCYTES NFR BLD AUTO: 7 % (ref 4–12)
NEUTROPHILS # BLD AUTO: 8.14 THOUSANDS/ΜL (ref 1.85–7.62)
NEUTS SEG NFR BLD AUTO: 69 % (ref 43–75)
NRBC BLD AUTO-RTO: 0 /100 WBCS
PLATELET # BLD AUTO: 179 THOUSANDS/UL (ref 149–390)
PMV BLD AUTO: 10.3 FL (ref 8.9–12.7)
POTASSIUM SERPL-SCNC: 3.4 MMOL/L (ref 3.5–5.3)
PROT SERPL-MCNC: 8.3 G/DL (ref 6.4–8.4)
RBC # BLD AUTO: 5.1 MILLION/UL (ref 3.88–5.62)
SODIUM SERPL-SCNC: 134 MMOL/L (ref 135–147)
WBC # BLD AUTO: 11.78 THOUSAND/UL (ref 4.31–10.16)

## 2024-10-05 PROCEDURE — 96361 HYDRATE IV INFUSION ADD-ON: CPT

## 2024-10-05 PROCEDURE — 93005 ELECTROCARDIOGRAM TRACING: CPT

## 2024-10-05 PROCEDURE — 80053 COMPREHEN METABOLIC PANEL: CPT | Performed by: EMERGENCY MEDICINE

## 2024-10-05 PROCEDURE — 83735 ASSAY OF MAGNESIUM: CPT | Performed by: EMERGENCY MEDICINE

## 2024-10-05 PROCEDURE — 83690 ASSAY OF LIPASE: CPT | Performed by: EMERGENCY MEDICINE

## 2024-10-05 PROCEDURE — 82077 ASSAY SPEC XCP UR&BREATH IA: CPT | Performed by: EMERGENCY MEDICINE

## 2024-10-05 PROCEDURE — 99285 EMERGENCY DEPT VISIT HI MDM: CPT

## 2024-10-05 PROCEDURE — 36415 COLL VENOUS BLD VENIPUNCTURE: CPT | Performed by: EMERGENCY MEDICINE

## 2024-10-05 PROCEDURE — 96374 THER/PROPH/DIAG INJ IV PUSH: CPT

## 2024-10-05 PROCEDURE — 85025 COMPLETE CBC W/AUTO DIFF WBC: CPT | Performed by: EMERGENCY MEDICINE

## 2024-10-05 PROCEDURE — HZ2ZZZZ DETOXIFICATION SERVICES FOR SUBSTANCE ABUSE TREATMENT: ICD-10-PCS | Performed by: EMERGENCY MEDICINE

## 2024-10-05 PROCEDURE — 99285 EMERGENCY DEPT VISIT HI MDM: CPT | Performed by: EMERGENCY MEDICINE

## 2024-10-05 RX ORDER — TRAZODONE HYDROCHLORIDE 50 MG/1
50 TABLET, FILM COATED ORAL
Status: DISCONTINUED | OUTPATIENT
Start: 2024-10-05 | End: 2024-10-08 | Stop reason: HOSPADM

## 2024-10-05 RX ORDER — LORAZEPAM 2 MG/ML
1 INJECTION INTRAMUSCULAR ONCE
Status: COMPLETED | OUTPATIENT
Start: 2024-10-05 | End: 2024-10-05

## 2024-10-05 RX ORDER — ENOXAPARIN SODIUM 100 MG/ML
40 INJECTION SUBCUTANEOUS DAILY
Status: DISCONTINUED | OUTPATIENT
Start: 2024-10-06 | End: 2024-10-08 | Stop reason: HOSPADM

## 2024-10-05 RX ORDER — NICOTINE 21 MG/24HR
1 PATCH, TRANSDERMAL 24 HOURS TRANSDERMAL DAILY PRN
Status: DISCONTINUED | OUTPATIENT
Start: 2024-10-05 | End: 2024-10-06

## 2024-10-05 RX ORDER — ONDANSETRON 2 MG/ML
4 INJECTION INTRAMUSCULAR; INTRAVENOUS EVERY 6 HOURS PRN
Status: DISCONTINUED | OUTPATIENT
Start: 2024-10-05 | End: 2024-10-08 | Stop reason: HOSPADM

## 2024-10-05 RX ORDER — POTASSIUM CHLORIDE 14.9 MG/ML
20 INJECTION INTRAVENOUS ONCE
Status: COMPLETED | OUTPATIENT
Start: 2024-10-06 | End: 2024-10-06

## 2024-10-05 RX ORDER — LORAZEPAM 1 MG/1
1 TABLET ORAL EVERY 8 HOURS PRN
Status: DISCONTINUED | OUTPATIENT
Start: 2024-10-05 | End: 2024-10-06

## 2024-10-05 RX ORDER — SODIUM CHLORIDE 9 MG/ML
125 INJECTION, SOLUTION INTRAVENOUS CONTINUOUS
Status: DISCONTINUED | OUTPATIENT
Start: 2024-10-06 | End: 2024-10-06

## 2024-10-05 RX ORDER — ACETAMINOPHEN 325 MG/1
650 TABLET ORAL EVERY 6 HOURS PRN
Status: DISCONTINUED | OUTPATIENT
Start: 2024-10-05 | End: 2024-10-08 | Stop reason: HOSPADM

## 2024-10-05 RX ADMIN — SODIUM CHLORIDE 1000 ML: 0.9 INJECTION, SOLUTION INTRAVENOUS at 21:39

## 2024-10-05 RX ADMIN — LORAZEPAM 1 MG: 2 INJECTION INTRAMUSCULAR; INTRAVENOUS at 21:40

## 2024-10-06 LAB
ALBUMIN SERPL BCG-MCNC: 3.8 G/DL (ref 3.5–5)
ALP SERPL-CCNC: 38 U/L (ref 34–104)
ALT SERPL W P-5'-P-CCNC: 15 U/L (ref 7–52)
ANION GAP SERPL CALCULATED.3IONS-SCNC: 13 MMOL/L (ref 4–13)
AST SERPL W P-5'-P-CCNC: 41 U/L (ref 13–39)
BILIRUB SERPL-MCNC: 0.46 MG/DL (ref 0.2–1)
BUN SERPL-MCNC: 8 MG/DL (ref 5–25)
CALCIUM SERPL-MCNC: 8.3 MG/DL (ref 8.4–10.2)
CHLORIDE SERPL-SCNC: 108 MMOL/L (ref 96–108)
CO2 SERPL-SCNC: 17 MMOL/L (ref 21–32)
CREAT SERPL-MCNC: 0.63 MG/DL (ref 0.6–1.3)
ERYTHROCYTE [DISTWIDTH] IN BLOOD BY AUTOMATED COUNT: 12.9 % (ref 11.6–15.1)
GFR SERPL CREATININE-BSD FRML MDRD: 107 ML/MIN/1.73SQ M
GLUCOSE SERPL-MCNC: 65 MG/DL (ref 65–140)
HCT VFR BLD AUTO: 44.7 % (ref 36.5–49.3)
HGB BLD-MCNC: 16.2 G/DL (ref 12–17)
MAGNESIUM SERPL-MCNC: 2.2 MG/DL (ref 1.9–2.7)
MCH RBC QN AUTO: 33.6 PG (ref 26.8–34.3)
MCHC RBC AUTO-ENTMCNC: 36.2 G/DL (ref 31.4–37.4)
MCV RBC AUTO: 93 FL (ref 82–98)
PLATELET # BLD AUTO: 152 THOUSANDS/UL (ref 149–390)
PMV BLD AUTO: 11.5 FL (ref 8.9–12.7)
POTASSIUM SERPL-SCNC: 4.1 MMOL/L (ref 3.5–5.3)
PROT SERPL-MCNC: 6.7 G/DL (ref 6.4–8.4)
RBC # BLD AUTO: 4.82 MILLION/UL (ref 3.88–5.62)
SODIUM SERPL-SCNC: 138 MMOL/L (ref 135–147)
WBC # BLD AUTO: 7.39 THOUSAND/UL (ref 4.31–10.16)

## 2024-10-06 PROCEDURE — 83735 ASSAY OF MAGNESIUM: CPT | Performed by: PHYSICIAN ASSISTANT

## 2024-10-06 PROCEDURE — 99255 IP/OBS CONSLTJ NEW/EST HI 80: CPT | Performed by: EMERGENCY MEDICINE

## 2024-10-06 PROCEDURE — 85027 COMPLETE CBC AUTOMATED: CPT | Performed by: PHYSICIAN ASSISTANT

## 2024-10-06 PROCEDURE — 99223 1ST HOSP IP/OBS HIGH 75: CPT | Performed by: INTERNAL MEDICINE

## 2024-10-06 PROCEDURE — 80053 COMPREHEN METABOLIC PANEL: CPT | Performed by: PHYSICIAN ASSISTANT

## 2024-10-06 RX ORDER — PHENOBARBITAL 64.8 MG/1
64.8 TABLET ORAL ONCE
Status: COMPLETED | OUTPATIENT
Start: 2024-10-06 | End: 2024-10-06

## 2024-10-06 RX ORDER — PHENOBARBITAL SODIUM 130 MG/ML
130 INJECTION, SOLUTION INTRAMUSCULAR; INTRAVENOUS ONCE
Status: COMPLETED | OUTPATIENT
Start: 2024-10-06 | End: 2024-10-06

## 2024-10-06 RX ORDER — PANTOPRAZOLE SODIUM 40 MG/1
40 TABLET, DELAYED RELEASE ORAL
Status: DISCONTINUED | OUTPATIENT
Start: 2024-10-06 | End: 2024-10-08 | Stop reason: HOSPADM

## 2024-10-06 RX ORDER — NICOTINE 21 MG/24HR
21 PATCH, TRANSDERMAL 24 HOURS TRANSDERMAL DAILY
Status: DISCONTINUED | OUTPATIENT
Start: 2024-10-06 | End: 2024-10-08 | Stop reason: HOSPADM

## 2024-10-06 RX ORDER — PHENOBARBITAL SODIUM 65 MG/ML
65 INJECTION, SOLUTION INTRAMUSCULAR; INTRAVENOUS ONCE
Status: COMPLETED | OUTPATIENT
Start: 2024-10-06 | End: 2024-10-06

## 2024-10-06 RX ORDER — PHENOBARBITAL SODIUM 130 MG/ML
260 INJECTION, SOLUTION INTRAMUSCULAR; INTRAVENOUS ONCE
Status: COMPLETED | OUTPATIENT
Start: 2024-10-06 | End: 2024-10-06

## 2024-10-06 RX ADMIN — PANTOPRAZOLE SODIUM 40 MG: 40 TABLET, DELAYED RELEASE ORAL at 05:12

## 2024-10-06 RX ADMIN — PHENOBARBITAL SODIUM 260 MG: 130 INJECTION INTRAMUSCULAR; INTRAVENOUS at 00:14

## 2024-10-06 RX ADMIN — PHENOBARBITAL SODIUM 65 MG: 65 INJECTION INTRAMUSCULAR; INTRAVENOUS at 19:21

## 2024-10-06 RX ADMIN — PHENOBARBITAL SODIUM 130 MG: 130 INJECTION INTRAMUSCULAR; INTRAVENOUS at 13:49

## 2024-10-06 RX ADMIN — PHENOBARBITAL SODIUM 130 MG: 130 INJECTION INTRAMUSCULAR; INTRAVENOUS at 08:36

## 2024-10-06 RX ADMIN — TRAZODONE HYDROCHLORIDE 50 MG: 50 TABLET ORAL at 23:09

## 2024-10-06 RX ADMIN — PHENOBARBITAL 64.8 MG: 64.8 TABLET ORAL at 23:09

## 2024-10-06 RX ADMIN — POTASSIUM CHLORIDE 20 MEQ: 14.9 INJECTION, SOLUTION INTRAVENOUS at 00:07

## 2024-10-06 RX ADMIN — FOLIC ACID: 5 INJECTION, SOLUTION INTRAMUSCULAR; INTRAVENOUS; SUBCUTANEOUS at 08:21

## 2024-10-06 RX ADMIN — SODIUM CHLORIDE 125 ML/HR: 0.9 INJECTION, SOLUTION INTRAVENOUS at 08:37

## 2024-10-06 RX ADMIN — MULTIPLE VITAMINS W/ MINERALS TAB 1 TABLET: TAB ORAL at 08:21

## 2024-10-06 RX ADMIN — SODIUM CHLORIDE 125 ML/HR: 0.9 INJECTION, SOLUTION INTRAVENOUS at 00:05

## 2024-10-06 RX ADMIN — NICOTINE 21 MG: 21 PATCH, EXTENDED RELEASE TRANSDERMAL at 12:26

## 2024-10-06 NOTE — ASSESSMENT & PLAN NOTE
Recent Labs     10/05/24  2136 10/06/24  0445 10/07/24  0506   SODIUM 134* 138 138   CL 97 108 106   Likely due to dehydration versus alcohol potomania; SIADH less likely  Continue IVF hydration, encourage p.o. intake  Monitor a.m. labs for improvement; primary care team to f/u and monitor

## 2024-10-06 NOTE — ASSESSMENT & PLAN NOTE
Recent Labs     10/05/24  2136 10/06/24  0445 10/07/24  0506   AST 44* 41* 29   ALT 21 15 16   ALKPHOS 49 38 40   In setting of acute alcohol consumption/withdrawal  Denies any abdominal pain, skin discoloration, stool color changes or excessive weight loss  Alcohol cessation counseling provided  Stable after IV fluid hydration; primary care team to f/u and monitor

## 2024-10-06 NOTE — CONSULTS
"Consultation - Medical Toxicology   Name: Steve Dunham 59 y.o. male I MRN: [id number]  Unit/Bed#: 5T DETOX 514-01 I Date of Admission: 10/5/2024   Date of Service: 10/6/2024 I Hospital Day: 1   Inpatient consult to Toxicology  Consult performed by: Opal Cassidy MD  Consult ordered by: Sam Patricia PA-C      Physician Requesting Evaluation: Antonio Bueno DO   Reason for Evaluation / Principal Problem: Alcohol withdrawal, alcohol use disorder    Assessment & Plan  Alcohol withdrawal syndrome with complication, with unspecified complication (HCC)  Continue SEWS protocol with symptom-triggered, as needed phenobarbital. Received 260 mg so far    Continue phenobarbital as indicated with maximum total dose of 2 grams  Alcohol use disorder, severe, dependence (HCC)  Continue daily thiamine, folate, and multivitamin supplementation    Patient states naltrexone causes \"brain vibrations\"; not interested in acamprosate    Recommend CRS consultation for recovery support    Recommend case management consultation for disposition planning; patient would like to re-connect with AA upon discharge  Nicotine dependence  Currently smokes 1 ppd; recommend nicotine replacement therapy (21 mg patch)    I have discussed the above management plan in detail with the primary service.     Medical Toxicology service will follow.    For further questions, please contact the medical  on call via SecureChat between 8am and 9pm. If between 9pm and 8am, please reach out to the Poison [facility] at [phone].     History of Present Illness   Steve Dunham is a 59 y.o. year old male who presents with acute alcohol intoxication and request for medically supervised alcohol withdrawal management. Patient last admitted to the withdrawal management unit in December 2023; patient states he started drinking soon after discharge. Endorses drinking 30 beers daily with last drink yesterday. Currently endorses fatigue " "and mild tremor. Denies other substance use including benzodiazepines. Endorses smoking 1ppd. Patient states naltrexone caused \"brain vibrations\" previously and he was advised not to take it. Denies recent falls. Tolerating PO intake and ambulating without assistance.    Review of Systems   Constitutional:  Positive for fatigue. Negative for appetite change and diaphoresis.   Gastrointestinal:  Negative for abdominal pain, nausea and vomiting.   Neurological:  Positive for tremors.       Historical Information   I have reviewed the patient's PMH, PSH, Social History, Family History, Meds, and Allergies  Social History     Tobacco Use    Smoking status: Every Day     Current packs/day: 2.00     Types: Cigarettes    Smokeless tobacco: Current   Vaping Use    Vaping status: Never Used   Substance and Sexual Activity    Alcohol use: Yes     Comment: 30 pack of beer minimum daily    Drug use: Not Currently     Types: Cocaine    Sexual activity: Not on file     History reviewed. No pertinent family history.    Meds/Allergies   Prior to Admission medications    Medication Sig Start Date End Date Taking? Authorizing Provider   folic acid (FOLVITE) 1 mg tablet Take 1 tablet by mouth daily for 30 days 4/3/17 5/3/17  Elsa Ham MD   nicotine (NICODERM CQ) 21 mg/24 hr TD 24 hr patch Place 1 patch on the skin daily for 30 days 4/3/17 5/3/17  Elsa Ham MD     Current Facility-Administered Medications:     acetaminophen (TYLENOL) tablet 650 mg, 650 mg, Oral, Q6H PRN, Sam Patricia PA-C    enoxaparin (LOVENOX) subcutaneous injection 40 mg, 40 mg, Subcutaneous, Daily, Sam Patricia PA-C    folic acid 1 mg, thiamine (VITAMIN B1) 100 mg in sodium chloride 0.9 % 100 mL IV piggyback, , Intravenous, Daily, Sam Patricia PA-C, New Bag at 10/06/24 0821    multivitamin-minerals (CENTRUM) tablet 1 tablet, 1 tablet, Oral, Daily, Sam Patricia PA-C, 1 tablet at 10/06/24 0821    " nicotine (NICODERM CQ) 21 mg/24 hr TD 24 hr patch 21 mg, 21 mg, Transdermal, Daily, Opal Cassidy MD    ondansetron (ZOFRAN) injection 4 mg, 4 mg, Intravenous, Q6H PRN, Sam Patricia PA-C    pantoprazole (PROTONIX) EC tablet 40 mg, 40 mg, Oral, Early Morning, Sam Patricia PA-C, 40 mg at 10/06/24 0512    sodium chloride 0.9 % infusion, 125 mL/hr, Intravenous, Continuous, Sam Patricia PA-C, Last Rate: 125 mL/hr at 10/06/24 0837, 125 mL/hr at 10/06/24 0837    traZODone (DESYREL) tablet 50 mg, 50 mg, Oral, HS PRN, Sam Patricia PA-C   No Known Allergies    Objective :  Temp:  [97.1 °F (36.2 °C)-98.2 °F (36.8 °C)] 97.1 °F (36.2 °C)  HR:  [70-89] 76  BP: (107-159)/(64-89) 123/77  Resp:  [16-18] 18  SpO2:  [92 %-100 %] 92 %  O2 Device: None (Room air)      Intake/Output Summary (Last 24 hours) at 10/6/2024 1203  Last data filed at 10/6/2024 0005  Gross per 24 hour   Intake 950 ml   Output --   Net 950 ml       Physical Exam      Lab Results: I have reviewed the following results:  Results from last 7 days   Lab Units 10/06/24  0445 10/05/24  2136   WBC Thousand/uL 7.39 11.78*   HEMOGLOBIN g/dL 16.2 16.9   HEMATOCRIT % 44.7 48.5   PLATELETS Thousands/uL 152 179   SEGS PCT %  --  69   LYMPHO PCT %  --  23   MONO PCT %  --  7   EOS PCT %  --  0      Results from last 7 days   Lab Units 10/06/24  0445   POTASSIUM mmol/L 4.1   CHLORIDE mmol/L 108   CO2 mmol/L 17*   BUN mg/dL 8   CREATININE mg/dL 0.63   CALCIUM mg/dL 8.3*   ALBUMIN g/dL 3.8   ALK PHOS U/L 38   ALT U/L 15   AST U/L 41*   MAGNESIUM mg/dL 2.2                       Results from last 7 days   Lab Units 10/05/24  2136   ETHANOL LVL mg/dL 237*     Imaging Results Review: No pertinent imaging studies reviewed.  Other Study Results Review: EKG was reviewed.     Administrative Statements   I have spent a total time of 25 minutes in caring for this patient on the day of the visit/encounter including Risks and  benefits of tx options, Patient and family education, Risk factor reductions, Impressions, Counseling / Coordination of care, Documenting in the medical record, Reviewing / ordering tests, medicine, procedures  , Obtaining or reviewing history  , and Communicating with other healthcare professionals .

## 2024-10-06 NOTE — ASSESSMENT & PLAN NOTE
Recent Labs     10/05/24  2136 10/06/24  0445 10/07/24  0506   K 3.4* 4.1 3.6     Replenished; Monitor AM lab for improvement

## 2024-10-06 NOTE — H&P
"HISTORY & PHYSICAL EXAM  DEPARTMENT OF MEDICAL TOXICOLOGY  LEVEL 4 MEDICAL DETOX UNIT  Steve Dunham 59 y.o. male MRN: [id number]  Unit/Bed#: [address] [place] [address] 514-01 Encounter: [id number]      Reason for Admission/Principal Problem: Ethanol withdrawal, Ethanol use disorder  Admitting Provider: Nelda Tillman PA-C  Attending Provider: Antonio Bueno DO   10/5/2024  9:27 PM        * Alcohol withdrawal syndrome with complication, with unspecified complication (HCC)  Assessment & Plan  Recent Labs     10/05/24  2136   ETOH 237*   Last drink: 10/5/24 AM  ED treatment: Ativan 2 mg IV, 1000 ml NS  Currently endorsing: nausea, restless, tremors  Toxicology consult ordered: Appreciate recommendation   initiate SEWS protocol with symptom-triggered phenobarbital for medically-assisted alcohol withdrawal  Initial SEWs score 11  Total Phenobarbital: 260 mg  Telemetry and pulse oximetry monitoring   Continue to monitor vitals, symptoms      Alcohol use disorder, severe, dependence (HCC)  Assessment & Plan  Pt with a h/o chronic heavy alcohol use; denies alcohol withdrawal seizure  Was last seen at [facility] detox unit December 2023; admits relapsed 8 months ago  Consuming about 30 packs of beer daily, last drink a.m. 10/05/2024  Not a candidate for Naltrexone: endorses adverse reaction  \" Naltrexone makes my head vibrate\"  Withdrawal management as above  Initiate IVFs, IV thiamine, folic acid, and MVI  Consult case management/CRS for assistance with aftercare resources: OP rehab resources     Hypokalemia  Assessment & Plan  Recent Labs     10/05/24  2136   K 3.4*     Replenished; Monitor AM lab for improvement    GERD (gastroesophageal reflux disease)  Assessment & Plan  Currently asymptomatic; continue home medication equivalent  Pantoprazole 40 mg daily    Transaminitis  Assessment & Plan  Recent Labs     10/05/24  2136   AST 44*   ALT 21   ALKPHOS 49   In setting of acute alcohol consumption/withdrawal  Denies any abdominal pain, " skin discoloration, stool color changes or excessive weight loss  Alcohol cessation counseling provided  Continue IV fluid hydration, monitor a.m. lab for improvement      Nicotine dependence  Assessment & Plan  Endorses 1ppd smoking history  Tobacco use cessation counseling provided  NRT ordered    Hyponatremia  Assessment & Plan  Recent Labs     10/05/24  2136   SODIUM 134*   CL 97   Likely due to dehydration versus alcohol potomania; SIADH less likely  Continue IVF hydration, encourage p.o. intake  Monitor a.m. labs for improvement                 VTE Prophylaxis: Enoxaparin (Lovenox)  / sequential compression device   Code Status: Full code      Anticipated Length of Stay:  Patient will be admitted on an Inpatient basis with an anticipated length of stay of 2-3 midnights.   Justification for Hospital Stay: Alcohol withdrawal syndrome    For any questions or concerns, please Tiger Text the advanced practitioner in the role of [facility] On Call      This patient qualifies for Level IV medically managed intensive inpatient services under the criteria set by the American Society of Addiction Medicine, including dimensions 1-3. The patient is in withdrawal (or is intoxicated with high risk of withdrawal), with severe and unstable medical and/or psychiatric (dual diagnosis) problems, requiring requires 24-hour medical and nursing care and the full resources of a licensed hospital.          SEWS PHENOBARBITAL PROTOCOL FOR ALCOHOL WITHDRAWAL    Admit patient to medical detox unit and continue supportive care and stabilization of acute ethanol withdrawal per medical toxicology/detox treatment pathway. Monitor ethanol withdrawal severity via the Severity of Ethanol Withdrawal Scale (SEWS) Q4 hours and then hourly if/when SEWS > 6. Treat withdrawal per pathway and reassess Q30-60 minutes.           Mild SEWS Score 1-6  Administer medications* (IV or PO; PO preferred):  If initial SEWS score: diazepam 10mg PO/IV x 1 AND  phenobarbital 65 mg PO/IV x 1  If repeat SEWS score 1-6: phenobarbital 65 mg PO/IV q1 hour x 5 doses maximum   Reassessment:   SEWS q1 hour after each dose until SEWS 0 x 2 hours  VS q1 hours (until SEWS 0, then q4 hours)  Notify provider for bedside evaluation if 5-dose maximum is reached, RASS of -3 to -5, or SEWS score escalates to moderate or severe.   Moderate SEWS Score 7-12  Administer medications* (IV):  If initial SEWS score: diazepam 10mg IV x 1 AND phenobarbital 260 mg IV x 1  If repeat SEWS score 7-12 or score escalated from mild: phenobarbital 130 mg IV q30 minutes x 5 doses maximum   Reassessment:  SEWS q30 minutes after each dose until SEWS < 7 (then hourly until SEWS 0 x 2 hours)  VS q30 minutes until SEWS < 7 (then hourly until SEWS 0, then q4 hours)  Notify provider for bedside evaluation if 5-dose maximum is reached, RASS of -3 to -5, or SEWS score escalates to severe.   Severe SEWS Score >= 13  Administer medications* (IV):  If initial SEWS score: Diazepam 10 mg IV x 1 AND phenobarbital 650 mg IV piggyback x 1 over 15-30 minutes  If repeat SEWS score >= 13 or score escalated from mild or moderate: phenobarbital 130 mg IV q30 minutes x 5 doses maximum   Reassessment:  SEWS q30 minutes after each dose until SEWS < 7 (then hourly until SEWS 0 x 2 hours)   VS q30 minutes until SEWS < 7 (then hourly until SEWS 0, then q4 hours)  Notify provider for bedside evaluation if 5-dose maximum is reached or RASS of -3 to -5   *Hold medications and notify provider if CNS depression, respirations < 10/min, or RASS of -3 to -5.         Medications to be administered adjunctively if more than 2 grams of phenobarbital is needed for stabilization of withdrawal; require attending approval.   Dexmedetomidine infusion 0.1-1mcg/kg/hr IV infusion, titratable to reduced agitation (Goal: RASS -2)  Ketamine   Acute agitated delirium: 1-2 mg/kg IV or 4-5 mg/kg IM  Refractory withdrawal: 0.1-1mg/kg/hr IV infusion, titratable  to reduced agitation (Goal: RASS -2)    Further evaluation, screening and treatment:  Evaluate complete metabolic panel, transaminases, INR, and lipase. Assess hepatic ultrasound for any sign of alcoholic liver disease or cirrhosis, and ultimately refer for further hepatic evaluation and care as/if indicated.    Additional medications for ethanol associated malnutrition:  Thiamine 100 mg IV daily, increase to 500 mg TID for signs/symptoms of Wernicke's Encephalopathy or Wernicke Korsakoff Syndrome   Folic acid 1 mg IV daily   Multivitamin PO daily      Will offer first monthly injection of Naltrexone 380 mg IM, once patient is stabilized, as it has been shown to assist in decreasing cravings for ethanol.     Evaluate and treat for coexisting substance use, such as opioids and nicotine. Discuss risk factors for infectious disease, such as history of intravenous drug abuse, and offer hepatitis and HIV screening if indicated.     Case management consultation to assist with coordination of subsequent treatment after discharge.          Hx and PE limited by: None    HPI: Steve Dunham is a 59 y.o. year old male with a past medical history of GERD and alcohol use disorder who presents requesting treatment for alcohol withdrawal.  Patient stated that following his last visit to [facility]'s detox unit 12/2023, was able to maintain sobriety for 2 months prior to relapsing.  Admits for the past 8 months, has been consuming about 30 packs of beer daily with a last known drink a.m. 10/05/2024.  Patient stated that multiple attempt at self detox resulted in severe withdrawal symptoms leading to his continued alcohol consumption.  Patient arrived to the ED complaining of diaphoresis, nausea, restlessness and anxious.  Admits had severe adverse reaction while on naltrexone.    In the ED, patient was noted to have an alcohol level of 237, potassium 3.4, sodium 134, WBC 11.78 and AST 44.  Patient received 2 mg of Ativan and 1000 mL  of normal saline prior to transfer to the detox unit for further evaluation.      Preferred alcoholic beverage(s): Beer  Quantity and frequency of alcohol intake: 30 packs, daily  Use of any ethanol substitutes (toxic alcohols): no  Date/Time of last alcohol intake: A.m., 10/05/2024  Current signs and symptoms of ethanol withdrawal: anxiety, tremor, and nausea    SEWS Total Score: 0 (10/6/2024 10:38 AM)      Ethanol Withdrawal History  Previous ethanol withdrawal? yes  Prior inpatient treatment for ethanol withdrawal? yes  Prior outpatient treatment for ethanol withdrawal? yes  History of seizures with prior ethanol withdrawal? no  Prior treatment with naltrexone (Vivitrol)? yes  Current treatment with naltrexone (Vivitrol)? no  Other current treatment for ethanol use disorder? no  Co-existing substance use? yes    Review of PDMP: yes     Social History     Substance and Sexual Activity   Alcohol Use Yes    Comment: 30 pack of beer minimum daily     Social History     Substance and Sexual Activity   Drug Use Not Currently    Types: Cocaine     Social History     Tobacco Use   Smoking Status Every Day    Current packs/day: 2.00    Types: Cigarettes   Smokeless Tobacco Current       Review of Systems   Constitutional:  Negative for chills and fever.   HENT:  Negative for ear pain and sore throat.    Eyes:  Negative for pain and visual disturbance.   Respiratory:  Negative for cough and shortness of breath.    Cardiovascular:  Negative for chest pain and palpitations.   Gastrointestinal:  Positive for nausea. Negative for abdominal pain, blood in stool and vomiting.   Genitourinary:  Negative for dysuria and hematuria.   Musculoskeletal:  Negative for arthralgias and back pain.   Skin:  Negative for color change and rash.   Neurological:  Positive for tremors. Negative for dizziness, seizures, syncope and headaches.   Psychiatric/Behavioral:  The patient is nervous/anxious.    All other systems reviewed and are  negative.      Historical Information   Past Medical History:   Diagnosis Date    Alcoholism (HCC)     Withdrawal symptoms, alcohol (HCC)      History reviewed. No pertinent surgical history.  History reviewed. No pertinent family history.  Social History   Marital Status: /Civil Union   Occupation: Unemployed  Patient Pre-hospital Living Situation: Family  Patient Pre-hospital Level of Mobility: Independent mobility  Patient Pre-hospital Diet Restrictions: None    No Known Allergies    Prior to Admission medications    Medication Sig Start Date End Date Taking? Authorizing Provider   folic acid (FOLVITE) 1 mg tablet Take 1 tablet by mouth daily for 30 days 4/3/17 5/3/17  Elas Ham MD   nicotine (NICODERM CQ) 21 mg/24 hr TD 24 hr patch Place 1 patch on the skin daily for 30 days 4/3/17 5/3/17  Elsa Ham MD         Current Facility-Administered Medications:     acetaminophen (TYLENOL) tablet 650 mg, Q6H PRN    enoxaparin (LOVENOX) subcutaneous injection 40 mg, Daily    folic acid 1 mg, thiamine (VITAMIN B1) 100 mg in sodium chloride 0.9 % 100 mL IV piggyback, Daily    LORazepam (ATIVAN) tablet 1 mg, Q8H PRN    multivitamin-minerals (CENTRUM) tablet 1 tablet, Daily    nicotine (NICODERM CQ) 21 mg/24 hr TD 24 hr patch 1 patch, Daily PRN    ondansetron (ZOFRAN) injection 4 mg, Q6H PRN    pantoprazole (PROTONIX) EC tablet 40 mg, Early Morning    sodium chloride 0.9 % infusion, Continuous, Last Rate: 125 mL/hr (10/06/24 0837)    traZODone (DESYREL) tablet 50 mg, HS PRN    Continuous Infusions:sodium chloride, 125 mL/hr, Last Rate: 125 mL/hr (10/06/24 0837)             Objective       Intake/Output Summary (Last 24 hours) at 10/6/2024 1119  Last data filed at 10/6/2024 0005  Gross per 24 hour   Intake 950 ml   Output --   Net 950 ml       Invasive Devices:   Peripheral IV 10/05/24 Right Antecubital (Active)       Vitals   Vitals:    10/06/24 0310 10/06/24 0447 10/06/24 0831 10/06/24 1037   BP: 136/89  107/70 123/77    TempSrc: Temporal Temporal Temporal    Pulse: 87 70 80 76   Resp: 18 16 18    Patient Position - Orthostatic VS: Lying Lying Lying    Temp: 97.6 °F (36.4 °C) (!) 97.2 °F (36.2 °C) (!) 97.1 °F (36.2 °C)        Physical Exam  Vitals and nursing note reviewed.   Constitutional:       General: He is not in acute distress.     Appearance: He is not ill-appearing, toxic-appearing or diaphoretic.   HENT:      Head: Normocephalic and atraumatic.      Nose: No congestion or rhinorrhea.   Eyes:      General: No scleral icterus.        Right eye: No discharge.         Left eye: No discharge.      Extraocular Movements: Extraocular movements intact.      Conjunctiva/sclera: Conjunctivae normal.      Pupils: Pupils are equal, round, and reactive to light.   Cardiovascular:      Rate and Rhythm: Normal rate and regular rhythm.      Pulses: Normal pulses.      Heart sounds: Normal heart sounds. No murmur heard.  Pulmonary:      Effort: Pulmonary effort is normal.      Breath sounds: Normal breath sounds. No stridor. No wheezing.   Abdominal:      General: Bowel sounds are normal.      Tenderness: There is no abdominal tenderness.   Musculoskeletal:         General: No swelling or tenderness. Normal range of motion.      Cervical back: Normal range of motion and neck supple. No rigidity or tenderness.      Right lower leg: No edema.      Left lower leg: No edema.   Skin:     General: Skin is warm.      Capillary Refill: Capillary refill takes less than 2 seconds.      Findings: No bruising, erythema or lesion.   Neurological:      Mental Status: He is oriented to person, place, and time.      GCS: GCS eye subscore is 4. GCS verbal subscore is 5. GCS motor subscore is 6.      Cranial Nerves: Cranial nerves 2-12 are intact.      Sensory: Sensation is intact.      Motor: Tremor present.      Coordination: Coordination is intact.      Gait: Gait is intact.   Psychiatric:         Attention and Perception: He does not  "perceive auditory or visual hallucinations.         Mood and Affect: Mood is anxious.         Thought Content: Thought content does not include homicidal or suicidal ideation. Thought content does not include homicidal or suicidal plan.           Data:    EKG, Pathology, and Other Studies: Results Review Statement: No pertinent imaging studies reviewed.  EKG: Reviewed    Lab Results:  CBC ETOH     Lab Results   Component Value Date    WBC 7.39 10/06/2024    RBC 4.82 10/06/2024    HGB 16.2 10/06/2024    HCT 44.7 10/06/2024    MCV 93 10/06/2024    MCH 33.6 10/06/2024    MCHC 36.2 10/06/2024    RDW 12.9 10/06/2024     10/06/2024    MPV 11.5 10/06/2024      No results found for: \"LACTICACID\"   CMP UA         Component Value Date/Time    K 4.1 10/06/2024 0445    K 3.9 05/07/2019 1713     10/06/2024 0445     05/07/2019 1713    CO2 17 (L) 10/06/2024 0445    CO2 26 05/07/2019 1713    BUN 8 10/06/2024 0445    BUN 13 05/07/2019 1713    CREATININE 0.63 10/06/2024 0445    CREATININE 0.91 05/07/2019 1713         Component Value Date/Time    CALCIUM 8.3 (L) 10/06/2024 0445    CALCIUM 8.6 05/07/2019 1713    ALKPHOS 38 10/06/2024 0445    ALKPHOS 70 05/07/2019 1713    AST 41 (H) 10/06/2024 0445    AST 18 05/07/2019 1713    ALT 15 10/06/2024 0445    ALT 21 05/07/2019 1713      Lab Results   Component Value Date    CLARITYU Clear 12/28/2023    COLORU Luisa (A) 12/28/2023    SPECGRAV 1.010 12/28/2023    PHUR 7.0 12/28/2023    GLUCOSEU Negative 12/28/2023    KETONESU 15 (1+) (A) 12/28/2023    BLOODU Negative 12/28/2023    PROTEIN UA 30 (1+) (A) 12/28/2023    NITRITE Negative 12/28/2023    BILIRUBINUR Negative 12/28/2023    UROBILINOGEN 4.0 (A) 12/28/2023    LEUKOCYTESUR Negative 12/28/2023    WBCUA 0-1 (A) 12/28/2023    RBCUA None Seen 12/28/2023    BACTERIA None Seen 12/28/2023    EPIS Occasional 12/28/2023        Liver Function Test: ASA     Lab Results   Component Value Date    TBILI 0.46 10/06/2024    TBILI 0.3 " "05/07/2019    ALKPHOS 38 10/06/2024    ALKPHOS 70 05/07/2019    AST 41 (H) 10/06/2024    AST 18 05/07/2019    ALT 15 10/06/2024    ALT 21 05/07/2019    TP 6.7 10/06/2024    TP 7.5 05/07/2019    ALB 3.8 10/06/2024    ALB 4.0 05/07/2019      No results found for: \"SALICYLATE\"   Troponin APAP     No results found for: \"TROPONINI\"   Lab Results   Component Value Date    ACTMNPHEN <2 (L) 04/02/2017      VBG HCG     No results found for: \"PHVEN\", \"WZA6JDF\", \"PO2VEN\", \"UBS8NFX\", \"BEVEN\", \"M8OXHHXKP\", \"K7TYFBR\"   No results found for: \"HCGQUANT\"   ABG Urine Drug Screen     No results found for: \"PHART\", \"PSF8YJC\", \"PO2ART\", \"GGZ1JZN\", \"BEART\", \"P3HMSMFUC\", \"O2HGB\", \"SOURC\", \"MADONNA\", \"VTAC\", \"ACRATE\", \"INSPIREDAIR\", \"PEEP\"   Lab Results   Component Value Date    AMPMETHUR Negative 12/28/2023    BARBTUR Positive (A) 12/28/2023    BDZUR Negative 12/28/2023    COCAINEUR Negative 12/28/2023    METHADONEUR Negative 12/28/2023    OPIATEUR Negative 12/28/2023    PCPUR Negative 12/28/2023    THCUR Positive (A) 12/28/2023    OXYCODONEUR Negative 12/28/2023      Lactate INR     No results found for: \"LACTICACID\"   Lab Results   Component Value Date    INR 0.91 04/01/2017      PTT Protime     Lab Results   Component Value Date/Time    PTT 25 04/01/2017 09:36 PM        Lab Results   Component Value Date/Time    PROTIME 12.4 04/01/2017 09:36 PM              Imaging Studies: Results Review Statement: No pertinent imaging studies reviewed.        ** Please Note: This note has been constructed using a voice recognition system. **  "

## 2024-10-06 NOTE — ED PROVIDER NOTES
"Final diagnoses:   Alcohol abuse   Alcohol withdrawal (HCC)     ED Disposition       ED Disposition   Admit    Condition   Stable    Date/Time   Sat Oct 5, 2024 10:26 PM    Comment   Case was discussed with Detox and the patient's admission status was agreed to be Admission Status: inpatient status to the service of Dr. Bueno .               Assessment & Plan       Medical Decision Making  Patient is high risk for serious withdrawal complications. Drinks 30 pack a day of beer. Wakes up sick. Has tried AA and other support groups without success. Last drink was just PTA \"chugged some beers\" so he doesn't get sick.    EKG okay, no qtc prolongation. Labs okay, elevated etoh. UDS and UA pending. Accepted to detox due to high risk of complications from alcohol abuse and withdrawal.     Amount and/or Complexity of Data Reviewed  Labs: ordered.    Risk  Prescription drug management.        ED Course as of 10/05/24 2227   Sat Oct 05, 2024   2150 Procedure Note: EKG  Date/Time: 10/05/24 9:50 PM   Performed by: Daron Baez  Authorized by: Daron Baez  ECG interpreted by me, the ED Provider: yes   The EKG demonstrates:  Rate 79  Rhythm sinus  QTc 449  No ST elevations/depressions           Medications   sodium chloride 0.9 % bolus 1,000 mL (1,000 mL Intravenous New Bag 10/5/24 2139)   LORazepam (ATIVAN) tablet 1 mg (has no administration in time range)   LORazepam (ATIVAN) injection 1 mg (1 mg Intravenous Given 10/5/24 2140)       ED Risk Strat Scores                CIWA-Ar Score       Row Name 10/05/24 2150 10/05/24 2130          CIWA-Ar    Blood Pressure -- --     Pulse -- --     Nausea and Vomiting -- 0     Tactile Disturbances -- 0     Tremor -- 4     Auditory Disturbances -- 0     Paroxysmal Sweats -- 1     Visual Disturbances -- 0     Anxiety -- 5     Headache, Fullness in Head -- 0     Agitation -- 0     Orientation and Clouding of Sensorium -- 0     CIWA-Ar Total -- 10                             SBIRT 22yo+  "     Flowsheet Row Most Recent Value   Initial Alcohol Screen: US AUDIT-C     1. How often do you have a drink containing alcohol? 6 Filed at: 10/05/2024 2130   2. How many drinks containing alcohol do you have on a typical day you are drinking?  6 Filed at: 10/05/2024 2130   3a. Male UNDER 65: How often do you have five or more drinks on one occasion? 6 Filed at: 10/05/2024 2130   Audit-C Score 18 Filed at: 10/05/2024 2130   Full Alcohol Screen: US AUDIT    4. How often during the last year have you found that you were not able to stop drinking once you had started? 4 Filed at: 10/05/2024 2130   5. How often during past year have you failed to do what was normally expected of you because of drinking?  4 Filed at: 10/05/2024 2130   6. How often in past year have you needed a first drink in the morning to get yourself going after a heavy drinking session?  4 Filed at: 10/05/2024 2130   7. How often in past year have you had feeling of guilt or remorse after drinking?  4 Filed at: 10/05/2024 2130   8. How often in past year have you been unable to remember what happened night before because you had been drinking?  4 Filed at: 10/05/2024 2130   9. Have you or someone else been injured as a result of your drinking?  4 Filed at: 10/05/2024 2130   10. Has a relative, friend, doctor or other health worker been concerned about your drinking and suggested you cut down?  4 Filed at: 10/05/2024 2130   AUDIT Total Score 46 Filed at: 10/05/2024 2130   EDDY: How many times in the past year have you...    Used an illegal drug or used a prescription medication for non-medical reasons? Never Filed at: 10/05/2024 2130                            History of Present Illness       Chief Complaint   Patient presents with    Alcohol Intoxication     Arrives requesting alcohol detox. Drinks at least a 30 pack of beer daily. No other substances. No SI/HI. Reports last drink was right before coming in. Reports hx of withdrawal shakes and  "\"almost seizure\" but denies having any actual seizures.        Past Medical History:   Diagnosis Date    Alcoholism (HCC)     Withdrawal symptoms, alcohol (HCC)       History reviewed. No pertinent surgical history.   History reviewed. No pertinent family history.   Social History     Tobacco Use    Smoking status: Every Day     Current packs/day: 2.00     Types: Cigarettes    Smokeless tobacco: Current   Vaping Use    Vaping status: Never Used   Substance Use Topics    Alcohol use: Yes     Comment: 30 pack of beer minimum daily    Drug use: Not Currently     Types: Cocaine      E-Cigarette/Vaping    E-Cigarette Use Never User       E-Cigarette/Vaping Substances      I have reviewed and agree with the history as documented.     Patient presents with:  Alcohol Intoxication: Arrives requesting alcohol detox. Drinks at least a 30 pack of beer daily. No other substances. No SI/HI. Reports last drink was right before coming in. Reports hx of withdrawal shakes and \"almost seizure\" but denies having any actual seizures.     59 year old male, here requesting help with etoh abuse, drinking 30 pack a day for a few weeks. Can't stop on his own, gets sweaty, anxious, nauseous without beer. Was in detox here in dec 23, felt phenobarb helped a lot. Would like to get through withdrawal until he can get  back on his footings with AA.         Review of Systems   Constitutional: Negative.  Negative for chills and fever.   HENT: Negative.  Negative for rhinorrhea, sore throat, trouble swallowing and voice change.    Eyes: Negative.  Negative for pain and visual disturbance.   Respiratory: Negative.  Negative for cough, shortness of breath and wheezing.    Cardiovascular: Negative.  Negative for chest pain and palpitations.   Gastrointestinal:  Negative for abdominal pain, diarrhea, nausea and vomiting.   Genitourinary: Negative.  Negative for dysuria and frequency.   Musculoskeletal: Negative.  Negative for neck pain and neck " stiffness.   Skin: Negative.  Negative for rash.   Neurological: Negative.  Negative for dizziness, speech difficulty, weakness, light-headedness and numbness.   Psychiatric/Behavioral:  Negative for hallucinations. The patient is nervous/anxious.            Objective       ED Triage Vitals [10/05/24 2129]   Temperature Pulse Blood Pressure Respirations SpO2 Patient Position - Orthostatic VS   98.1 °F (36.7 °C) 77 159/80 18 97 % Sitting      Temp Source Heart Rate Source BP Location FiO2 (%) Pain Score    Oral Monitor Left arm -- --      Vitals      Date and Time Temp Pulse SpO2 Resp BP Pain Score FACES Pain Rating User   10/05/24 2129 98.1 °F (36.7 °C) 77 97 % 18 159/80 -- -- CJ            Physical Exam  Vitals and nursing note reviewed.   Constitutional:       General: He is not in acute distress.     Appearance: He is well-developed.   HENT:      Head: Normocephalic and atraumatic.   Eyes:      Conjunctiva/sclera: Conjunctivae normal.      Pupils: Pupils are equal, round, and reactive to light.   Neck:      Trachea: No tracheal deviation.   Cardiovascular:      Rate and Rhythm: Normal rate and regular rhythm.   Pulmonary:      Effort: Pulmonary effort is normal. No respiratory distress.      Breath sounds: Normal breath sounds. No wheezing or rales.   Abdominal:      General: Bowel sounds are normal. There is no distension.      Palpations: Abdomen is soft.      Tenderness: There is no abdominal tenderness. There is no guarding or rebound.   Musculoskeletal:         General: No tenderness or deformity. Normal range of motion.      Cervical back: Normal range of motion and neck supple.   Skin:     General: Skin is warm and dry.      Capillary Refill: Capillary refill takes less than 2 seconds.      Findings: No rash.   Neurological:      Mental Status: He is alert and oriented to person, place, and time.   Psychiatric:         Behavior: Behavior normal.         Results Reviewed       Procedure Component Value  Units Date/Time    Comprehensive metabolic panel [[id number]]  (Abnormal) Collected: 10/05/24 2136    Lab Status: Final result Specimen: Blood from Arm, Right Updated: 10/05/24 2204     Sodium 134 mmol/L      Potassium 3.4 mmol/L      Chloride 97 mmol/L      CO2 26 mmol/L      ANION GAP 11 mmol/L      BUN 10 mg/dL      Creatinine 0.82 mg/dL      Glucose 93 mg/dL      Calcium 9.6 mg/dL      AST 44 U/L      ALT 21 U/L      Alkaline Phosphatase 49 U/L      Total Protein 8.3 g/dL      Albumin 4.6 g/dL      Total Bilirubin 0.52 mg/dL      eGFR 96 ml/min/1.73sq m     Narrative:      National Kidney Disease Foundation guidelines for Chronic Kidney Disease (CKD):     Stage 1 with normal or high GFR (GFR > 90 mL/min/1.73 square meters)    Stage 2 Mild CKD (GFR = 60-89 mL/min/1.73 square meters)    Stage 3A Moderate CKD (GFR = 45-59 mL/min/1.73 square meters)    Stage 3B Moderate CKD (GFR = 30-44 mL/min/1.73 square meters)    Stage 4 Severe CKD (GFR = 15-29 mL/min/1.73 square meters)    Stage 5 End Stage CKD (GFR <15 mL/min/1.73 square meters)  Note: GFR calculation is accurate only with a steady state creatinine    Magnesium [[id number]]  (Normal) Collected: 10/05/24 2136    Lab Status: Final result Specimen: Blood from Arm, Right Updated: 10/05/24 2204     Magnesium 2.0 mg/dL     Lipase [[id number]]  (Normal) Collected: 10/05/24 2136    Lab Status: Final result Specimen: Blood from Arm, Right Updated: 10/05/24 2204     Lipase 41 u/L     Ethanol [[id number]]  (Abnormal) Collected: 10/05/24 2136    Lab Status: Final result Specimen: Blood from Arm, Right Updated: 10/05/24 2204     Ethanol Lvl 237 mg/dL     CBC and differential [[id number]]  (Abnormal) Collected: 10/05/24 2136    Lab Status: Final result Specimen: Blood from Arm, Right Updated: 10/05/24 2146     WBC 11.78 Thousand/uL      RBC 5.10 Million/uL      Hemoglobin 16.9 g/dL      Hematocrit 48.5 %      MCV 95 fL      MCH 33.1 pg      MCHC 34.8 g/dL      RDW 12.6 %      MPV  10.3 fL      Platelets 179 Thousands/uL      nRBC 0 /100 WBCs      Segmented % 69 %      Immature Grans % 0 %      Lymphocytes % 23 %      Monocytes % 7 %      Eosinophils Relative 0 %      Basophils Relative 1 %      Absolute Neutrophils 8.14 Thousands/µL      Absolute Immature Grans 0.03 Thousand/uL      Absolute Lymphocytes 2.71 Thousands/µL      Absolute Monocytes 0.80 Thousand/µL      Eosinophils Absolute 0.04 Thousand/µL      Basophils Absolute 0.06 Thousands/µL     UA (URINE) with reflex to Scope [[id number]]     Lab Status: No result Specimen: Urine     Rapid drug screen, urine [[id number]]     Lab Status: No result Specimen: Urine             No orders to display       Procedures    ED Medication and Procedure Management   Prior to Admission Medications   Prescriptions Last Dose Informant Patient Reported? Taking?   folic acid (FOLVITE) 1 mg tablet   No No   Sig: Take 1 tablet by mouth daily for 30 days   nicotine (NICODERM CQ) 21 mg/24 hr TD 24 hr patch   No No   Sig: Place 1 patch on the skin daily for 30 days      Facility-Administered Medications: None     Patient's Medications   Discharge Prescriptions    No medications on file     No discharge procedures on file.  ED SEPSIS DOCUMENTATION   Time reflects when diagnosis was documented in both MDM as applicable and the Disposition within this note       Time User Action Codes Description Comment    10/5/2024 10:26 PM Bereket Baez [F10.10] Alcohol abuse     10/5/2024 10:26 PM Bereket Baez [F10.939] Alcohol withdrawal (HCC)                  Bereket Baez DO  10/05/24 2228

## 2024-10-06 NOTE — ASSESSMENT & PLAN NOTE
Recent Labs     10/05/24  2136   ETOH 237*   Last drink: 10/5/24 AM  ED treatment: Ativan 2 mg IV, 1000 ml NS  Currently endorsing: nausea, restless, tremors  Toxicology consulted.   Patient is no longer endorsing withdrawal symptoms. Discussed with Toxicology , SEWS protocol can be discontinued at this time.   Total phenobarbital: 260 mg

## 2024-10-06 NOTE — ASSESSMENT & PLAN NOTE
[facility] with symptom-triggered, as needed phenobarbital. Received 260 mg so far    Continue phenobarbital as indicated with maximum total dose of 2 grams

## 2024-10-06 NOTE — ASSESSMENT & PLAN NOTE
"Continue daily thiamine, folate, and multivitamin supplementation    Patient states naltrexone causes \"brain vibrations\"; not interested in acamprosate    Recommend CRS consultation for recovery support    Recommend case management consultation for disposition planning; patient would like to re-connect with AA upon discharge  "

## 2024-10-06 NOTE — ASSESSMENT & PLAN NOTE
Recent Labs     10/05/24  2136 10/06/24  0445 10/07/24  0506   SODIUM 134* 138 138   CL 97 108 106   Stable   Monitor a.m. labs for improvement

## 2024-10-06 NOTE — ASSESSMENT & PLAN NOTE
"Pt with a h/o chronic heavy alcohol use; denies alcohol withdrawal seizure  Was last seen at [facility] detox unit December 2023; admits relapsed 8 months ago  Consuming about 30 packs of beer daily, last drink a.m. 10/05/2024  Not a candidate for Naltrexone: endorses adverse reaction  \" Naltrexone makes my head vibrate\"  Withdrawal management as above  Initiate IVFs, IV thiamine, folic acid, and MVI  Consult case management/CRS for assistance with aftercare resources: OP rehab resources   "

## 2024-10-06 NOTE — ASSESSMENT & PLAN NOTE
Recent Labs     10/05/24  2136 10/06/24  0445 10/07/24  0506   AST 44* 41* 29   ALT 21 15 16   ALKPHOS 49 38 40   In setting of acute alcohol consumption/withdrawal  Has resolved since admission   Denies any abdominal pain, skin discoloration, stool color changes or excessive weight loss  Alcohol cessation counseling provided

## 2024-10-06 NOTE — ASSESSMENT & PLAN NOTE
Recent Labs     10/05/24  2136 10/06/24  0445 10/07/24  0506   K 3.4* 4.1 3.6   Stable after repletion by primary team

## 2024-10-07 PROBLEM — E87.1 HYPONATREMIA: Status: RESOLVED | Noted: 2017-04-02 | Resolved: 2024-10-07

## 2024-10-07 PROBLEM — E87.6 HYPOKALEMIA: Status: RESOLVED | Noted: 2024-10-05 | Resolved: 2024-10-07

## 2024-10-07 LAB
ALBUMIN SERPL BCG-MCNC: 3.6 G/DL (ref 3.5–5)
ALP SERPL-CCNC: 40 U/L (ref 34–104)
ALT SERPL W P-5'-P-CCNC: 16 U/L (ref 7–52)
ANION GAP SERPL CALCULATED.3IONS-SCNC: 8 MMOL/L (ref 4–13)
AST SERPL W P-5'-P-CCNC: 29 U/L (ref 13–39)
ATRIAL RATE: 72 BPM
ATRIAL RATE: 79 BPM
BASOPHILS # BLD AUTO: 0.05 THOUSANDS/ΜL (ref 0–0.1)
BASOPHILS NFR BLD AUTO: 1 % (ref 0–1)
BILIRUB SERPL-MCNC: 0.64 MG/DL (ref 0.2–1)
BUN SERPL-MCNC: 15 MG/DL (ref 5–25)
CALCIUM SERPL-MCNC: 8.4 MG/DL (ref 8.4–10.2)
CHLORIDE SERPL-SCNC: 106 MMOL/L (ref 96–108)
CO2 SERPL-SCNC: 24 MMOL/L (ref 21–32)
CREAT SERPL-MCNC: 0.76 MG/DL (ref 0.6–1.3)
EOSINOPHIL # BLD AUTO: 0.25 THOUSAND/ΜL (ref 0–0.61)
EOSINOPHIL NFR BLD AUTO: 3 % (ref 0–6)
ERYTHROCYTE [DISTWIDTH] IN BLOOD BY AUTOMATED COUNT: 12.8 % (ref 11.6–15.1)
GFR SERPL CREATININE-BSD FRML MDRD: 99 ML/MIN/1.73SQ M
GLUCOSE SERPL-MCNC: 84 MG/DL (ref 65–140)
HCT VFR BLD AUTO: 43.5 % (ref 36.5–49.3)
HGB BLD-MCNC: 15.8 G/DL (ref 12–17)
IMM GRANULOCYTES # BLD AUTO: 0.02 THOUSAND/UL (ref 0–0.2)
IMM GRANULOCYTES NFR BLD AUTO: 0 % (ref 0–2)
LYMPHOCYTES # BLD AUTO: 2.53 THOUSANDS/ΜL (ref 0.6–4.47)
LYMPHOCYTES NFR BLD AUTO: 29 % (ref 14–44)
MAGNESIUM SERPL-MCNC: 2 MG/DL (ref 1.9–2.7)
MCH RBC QN AUTO: 33.4 PG (ref 26.8–34.3)
MCHC RBC AUTO-ENTMCNC: 36.3 G/DL (ref 31.4–37.4)
MCV RBC AUTO: 92 FL (ref 82–98)
MONOCYTES # BLD AUTO: 0.56 THOUSAND/ΜL (ref 0.17–1.22)
MONOCYTES NFR BLD AUTO: 6 % (ref 4–12)
NEUTROPHILS # BLD AUTO: 5.44 THOUSANDS/ΜL (ref 1.85–7.62)
NEUTS SEG NFR BLD AUTO: 61 % (ref 43–75)
NRBC BLD AUTO-RTO: 0 /100 WBCS
P AXIS: 56 DEGREES
P AXIS: 61 DEGREES
PLATELET # BLD AUTO: 138 THOUSANDS/UL (ref 149–390)
PMV BLD AUTO: 11.1 FL (ref 8.9–12.7)
POTASSIUM SERPL-SCNC: 3.6 MMOL/L (ref 3.5–5.3)
PR INTERVAL: 150 MS
PR INTERVAL: 150 MS
PROT SERPL-MCNC: 6.2 G/DL (ref 6.4–8.4)
QRS AXIS: 40 DEGREES
QRS AXIS: 63 DEGREES
QRSD INTERVAL: 86 MS
QRSD INTERVAL: 90 MS
QT INTERVAL: 392 MS
QT INTERVAL: 396 MS
QTC INTERVAL: 433 MS
QTC INTERVAL: 449 MS
RBC # BLD AUTO: 4.73 MILLION/UL (ref 3.88–5.62)
SODIUM SERPL-SCNC: 138 MMOL/L (ref 135–147)
T WAVE AXIS: 47 DEGREES
T WAVE AXIS: 52 DEGREES
VENTRICULAR RATE: 72 BPM
VENTRICULAR RATE: 79 BPM
WBC # BLD AUTO: 8.85 THOUSAND/UL (ref 4.31–10.16)

## 2024-10-07 PROCEDURE — 99232 SBSQ HOSP IP/OBS MODERATE 35: CPT | Performed by: INTERNAL MEDICINE

## 2024-10-07 PROCEDURE — 83735 ASSAY OF MAGNESIUM: CPT

## 2024-10-07 PROCEDURE — 85025 COMPLETE CBC W/AUTO DIFF WBC: CPT

## 2024-10-07 PROCEDURE — 99232 SBSQ HOSP IP/OBS MODERATE 35: CPT | Performed by: EMERGENCY MEDICINE

## 2024-10-07 PROCEDURE — 93010 ELECTROCARDIOGRAM REPORT: CPT | Performed by: STUDENT IN AN ORGANIZED HEALTH CARE EDUCATION/TRAINING PROGRAM

## 2024-10-07 PROCEDURE — 80053 COMPREHEN METABOLIC PANEL: CPT

## 2024-10-07 RX ADMIN — NICOTINE 21 MG: 21 PATCH, EXTENDED RELEASE TRANSDERMAL at 08:36

## 2024-10-07 RX ADMIN — FOLIC ACID: 5 INJECTION, SOLUTION INTRAMUSCULAR; INTRAVENOUS; SUBCUTANEOUS at 08:36

## 2024-10-07 RX ADMIN — MULTIPLE VITAMINS W/ MINERALS TAB 1 TABLET: TAB ORAL at 08:36

## 2024-10-07 RX ADMIN — PANTOPRAZOLE SODIUM 40 MG: 40 TABLET, DELAYED RELEASE ORAL at 05:01

## 2024-10-07 NOTE — PROGRESS NOTES
10/07/24 1008   Referral Data   Referral Source Patient   Referral Name [facility] ED   Referral Reason Drug/Alcohol Abuse   County Information   County [place] Residence [place]   Readmission Root Cause   30 Day Readmission No   Patient Information   Mental Status Alert   Primary Caregiver Self   Support System Immediate family   Yazdanism/Cultural Requests Congregational   Activities of Daily Living Prior to Admission   Functional Status Independent   Assistive Device No device needed   Living Arrangement Lives with someone  (Pt reports he was living with brother and sister in law.  He is not allowed to return due to his heavy drinking..)   Ambulation Independent   Access to Firearms   Access to Firearms No   Income Information   Income Source Unemployed  (Pt reports his unemployment benefits were terminated and he has no benefits.)   Means of Transportation   Means of Transport to Appts: Public Transportation - Bus   [employer] Application Provided to Pt/Family             Substance Abuse Addendum Details   History of Withdrawal Symptoms   (anxiety, tremors,)   Medical Complications GERD   Sober Supports Ex- wife   Present Treatment detox   Substance Abuse Treatment Hx Past detox;Past Tx, Inpatient;Past Tx, Outpatient;Attends AA/NA   Stage of Change   Stage of Change Precontemplation       Additional Substance Use Detail    Questions Responses   Problems Due to Past Use of Alcohol? Yes   Problems Due to Past Use of Substances? No   Substance Use Assessment Denies substance use within the past 12 months   Alcohol Use Frequency Daily   Cannabis frequency Past occasional use   Comment:  Past occasional use on 10/7/2024    Alcohol Drink of Choice vodka   1st Use of Alcohol 12   Last Use of Alcohol & Amount 10/5/2024, 30+ cans of beer   Longest Abstinence from Alcohol 4 years   Cocaine method IV       Pt is a 59 year old male who was admitted to withdrawal management unit for alcohol withdrawal. Pt had presented to [facility] ED. Pt's name,  date of birth, home address and telephone number were verified. Pt was informed of case management role and the purpose of the completion of intake with case management. Pt presented as cooperative.         SUBSTANCE ABUSE     Stressor/Trigger               Alcohol Use Disorder   UDS: + THC +Jeannine  Audit: 46  PAWSS: 7          Nicotine: 1 Packs a Day  Ethanol: 237   Prior D&A treatment     Pt reports prior D&A historythe includes detox, IP, OP, and [facility] and two sober living facilities.    AA/NA Meetings    Pt reports attending AA meetings daily.    Withdrawal  Symptoms     Pt reports withdrawal symptoms of blurred vision, anxiety, shaking, unsteady legs, and nausea.    History of OD/blackouts or Withdrawal Seizures     + Blackouts, denies withdrawal seizures   MENTAL HEALTH INFORMATION     Hx of Mental Health Dx     Alcohol Use D/O Severe   Outpatient Mental Health Tx     Pt denies.    Inpatient Hospitalizations (Mental Health)     Pt denies.    Family History of Mental Health      Pt reports LISSETTE and AUD in family history on paternal side.    Trauma History      Pt denies.    Current MH Symptoms     Pt denies SI and HI. Pt reports hx of AVH of hearing a radio in the corner of his room. Pt denies present AVH.    Access to Firearms          Pt denies.    PHYSICAL HEALTH INFORMATION     Physical Health Conditions (Chronic)     Hepatitis C, GERD   PCP    Pt does not report PCP.    Insurance     [facility] ([phone])  [facility] [phone]   Preferred Pharmacy     Pt declines preferred pharmacy.    LEGAL ISSUES     Past or present legal issues     Pt reports past legal issues. Pt denies current legal issues.    Probation/Lynch    Pt denies.    EMPLOYMENT/INCOME/NEEDS     Education     Pt reports graduating high school and completing trade school.    Employment  Pt is currently unemployed.        History     Pt denies.   Spiritual/Sikhism Beliefs    Jew    Transportation/Transport  Home    Pt reports family transports or he utilizes public transportation.    DEMOGRAPHICS     Discharge Address    [address] [place] [address]   Living Arrangement     Pt lives in a home with brother and sister in law.   Can pt return home  No   External Supports                            Sabi Ferrell   Phone Number    [phone]   Email     N/A   Marital Status/Children    Pt reports two biological children, ages 31 and 16 years old.         ANDREIA signed for Sabi Zendejas- Ex-Wife and [place] Medicaid.       Discussed with patient: AUDIT score of 46 UDS/Identified Substance(s) used: Alcohol  Risks discussed included: relationship challenges with ex-spouse and children, housing stability, financial stability, employability.     Recommendations discussed: CM recommends IP AUD treatment  Patient's response: Pt accepting of IP treatment.      CM and Pt completed and signed relapse prevention plan.  Pt attributes relapse to his continued unemployment and lack of structured schedule.  Pt verbalized a lack of financial stability and inability to find employment due to drinking.    Pt reports being motivated by his ex-wife and desire to re- engage in the life of his 15 yo daughter.   Pt's goals for detox are to successfully complete medical withdrawal and to develop a discharge plan that includes relapse prevention education.  Pt is unsure if he is willing to commit to IP or OP.  CM encouraged Pt to discuss options with his support.  Pt is in the pre-contemplation.

## 2024-10-07 NOTE — ASSESSMENT & PLAN NOTE
Patient with a history of chronic daily alcohol use  Last drink 10/5/2024 in AM  Serum alcohol 237 in the ED  Received Ativan 2 mg IV, 1000 ml NS in the ED PTA  Discontinue SEWS protocol for medical management of alcohol withdrawal as objective and subjective findings of withdrawal have improved  SEWS score 11 upon admission  Received 260 mg of phenobarbital as initial dose- Received total of 650 mg thus far    SEWS Total Score: 0 (10/7/2024  8:00 AM)

## 2024-10-07 NOTE — PROGRESS NOTES
10/07/24 1216   Housing Stability   In the last 12 months, was there a time when you were not able to pay the mortgage or rent on time? Y   In the past 12 months, how many times have you moved where you were living? 0   At any time in the past 12 months, were you homeless or living in a shelter (including now)? Y   Transportation Needs   In the past 12 months, has lack of transportation kept you from medical appointments or from getting medications? no   In the past 12 months, has lack of transportation kept you from meetings, work, or from getting things needed for daily living? No   Food Insecurity   Within the past 12 months, you worried that your food would run out before you got the money to buy more. Sometimes   Within the past 12 months, the food you bought just didn't last and you didn't have money to get more. Sometimes   Intimate Partner Violence   Within the last year, have you been afraid of your partner or ex-partner? No   Within the last year, have you been humiliated or emotionally abused in other ways by your partner or ex-partner? No   Within the last year, have you been kicked, hit, slapped, or otherwise physically hurt by your partner or ex-partner? No   Within the last year, have you been raped or forced to have any kind of sexual activity by your partner or ex-partner? No   Alcohol Use   Q1: How often do you have a drink containing alcohol? 4 or more ti   Q2: How many drinks containing alcohol do you have on a typical day when you are drinking? 10 or more   Q3: How often do you have six or more drinks on one occasion? Daily   Utilities   In the past 12 months has the electric, gas, oil, or water company threatened to shut off services in your home? No

## 2024-10-07 NOTE — PROGRESS NOTES
"Progress Note - Hospitalist   Name: Steve Dunham 59 y.o. male I MRN: [id number]  Unit/Bed#: 5T [facility] 514-01 I Date of Admission: 10/5/2024   Date of Service: 10/7/2024 I Hospital Day: 2    Assessment & Plan  Alcohol withdrawal syndrome with complication, with unspecified complication (HCC)  Recent Labs     10/05/24  2136   ETOH 237*   Last drink: 10/5/24 AM  ED treatment: Ativan 2 mg IV, 1000 ml NS  Currently endorsing: nausea, restless, tremors  Toxicology consulted.   Patient is no longer endorsing withdrawal symptoms. Discussed with Toxicology , SEWS protocol can be discontinued at this time.   Total phenobarbital: 260 mg   Alcohol use disorder, severe, dependence (HCC)  Pt with a h/o chronic heavy alcohol use; denies alcohol withdrawal seizure  Was last seen at [facility] detox unit December 2023; admits relapsed 8 months ago  Consuming about 30 packs of beer daily, last drink a.m. 10/05/2024  Not a candidate for Naltrexone: endorses adverse reaction  \" Naltrexone makes my head vibrate\"  Withdrawal management as above  Initiate IVFs, IV thiamine, folic acid, and MVI  Consult case management/CRS for assistance with aftercare resources: OP rehab resources   Hyponatremia (Resolved: 10/7/2024)  Recent Labs     10/05/24  2136 10/06/24  0445 10/07/24  0506   SODIUM 134* 138 138   CL 97 108 106   Stable   Monitor a.m. labs for improvement    Nicotine dependence  Endorses 1ppd smoking history  Tobacco use cessation counseling provided  NRT ordered  Transaminitis  Recent Labs     10/05/24  2136 10/06/24  0445 10/07/24  0506   AST 44* 41* 29   ALT 21 15 16   ALKPHOS 49 38 40   In setting of acute alcohol consumption/withdrawal  Has resolved since admission   Denies any abdominal pain, skin discoloration, stool color changes or excessive weight loss  Alcohol cessation counseling provided      GERD (gastroesophageal reflux disease)  Currently asymptomatic; continue home medication equivalent  Pantoprazole 40 mg " daily  Hypokalemia (Resolved: 10/7/2024)  Recent Labs     10/05/24  2136 10/06/24  0445 10/07/24  0506   K 3.4* 4.1 3.6     Replenished; Monitor AM lab for improvement    VTE Pharmacologic Prophylaxis:   Moderate Risk (Score 3-4) - Pharmacological DVT Prophylaxis Ordered: enoxaparin (Lovenox).    Mobility:   Basic Mobility Inpatient Raw Score: 22  JH-HLM Goal: 7: Walk 25 feet or more  JH-HLM Achieved: 7: Walk 25 feet or more  JH-HLM Goal achieved. Continue to encourage appropriate mobility.    Patient Centered Rounds: I performed bedside rounds with nursing staff today.   Discussions with Specialists or Other Care Team Provider: Toxicology     Education and Discussions with Family / Patient: Patient declined call to .     Current Length of Stay: 2 day(s)  Current Patient Status: Inpatient   Certification Statement: The patient will continue to require additional inpatient hospital stay due to alcohol withdrawal   Discharge Plan: Anticipate discharge tomorrow to home vs inpatient drug and alcohol rehab     Code Status: Level 1 - Full Code    Subjective   Patient seen and examined at bedside. Denies further withdrawal symptoms. No acute complaints     Objective :  Temp:  [97 °F (36.1 °C)-98 °F (36.7 °C)] 97.5 °F (36.4 °C)  HR:  [71-89] 71  BP: (112-142)/(61-85) 136/78  Resp:  [16-18] 18  SpO2:  [94 %-98 %] 95 %  O2 Device: None (Room air)    Body mass index is 22.59 kg/m².     Input and Output Summary (last 24 hours):   No intake or output data in the 24 hours ending 10/07/24 1059    Physical Exam  Vitals reviewed.   Constitutional:       General: He is not in acute distress.     Appearance: He is not diaphoretic.   Eyes:      General: No scleral icterus.     Pupils: Pupils are equal, round, and reactive to light.   Cardiovascular:      Rate and Rhythm: Normal rate and regular rhythm.   Pulmonary:      Effort: No respiratory distress.      Breath sounds: Normal breath sounds.   Abdominal:      General:  Bowel sounds are normal. There is no distension.      Palpations: Abdomen is soft.   Neurological:      Mental Status: He is alert and oriented to person, place, and time.   Psychiatric:         Mood and Affect: Mood is not anxious or depressed.           Lines/Drains:              Lab Results: I have reviewed the following results:   Results from last 7 days   Lab Units 10/07/24  0506   WBC Thousand/uL 8.85   HEMOGLOBIN g/dL 15.8   HEMATOCRIT % 43.5   PLATELETS Thousands/uL 138*   SEGS PCT % 61   LYMPHO PCT % 29   MONO PCT % 6   EOS PCT % 3     Results from last 7 days   Lab Units 10/07/24  0506   SODIUM mmol/L 138   POTASSIUM mmol/L 3.6   CHLORIDE mmol/L 106   CO2 mmol/L 24   BUN mg/dL 15   CREATININE mg/dL 0.76   ANION GAP mmol/L 8   CALCIUM mg/dL 8.4   ALBUMIN g/dL 3.6   TOTAL BILIRUBIN mg/dL 0.64   ALK PHOS U/L 40   ALT U/L 16   AST U/L 29   GLUCOSE RANDOM mg/dL 84                       Recent Cultures (last 7 days):         Imaging Results Review: No pertinent imaging studies reviewed.  Other Study Results Review: No additional pertinent studies reviewed.    Last 24 Hours Medication List:     Current Facility-Administered Medications:     acetaminophen (TYLENOL) tablet 650 mg, Q6H PRN    enoxaparin (LOVENOX) subcutaneous injection 40 mg, Daily    folic acid 1 mg, thiamine (VITAMIN B1) 100 mg in sodium chloride 0.9 % 100 mL IV piggyback, Daily    multivitamin-minerals (CENTRUM) tablet 1 tablet, Daily    nicotine (NICODERM CQ) 21 mg/24 hr TD 24 hr patch 21 mg, Daily    ondansetron (ZOFRAN) injection 4 mg, Q6H PRN    pantoprazole (PROTONIX) EC tablet 40 mg, Early Morning    traZODone (DESYREL) tablet 50 mg, HS PRN    Administrative Statements   Today, Patient Was Seen By: Nelda Tillman PA-C      **Please Note: This note may have been constructed using a voice recognition system.**

## 2024-10-07 NOTE — UTILIZATION REVIEW
"Initial Clinical Review    Pt presented to [facility] for its Level IV medically managed intensive inpatient detox unit.    Admission: Date/Time/Statement:   Admission Orders (From admission, onward)       Ordered        10/05/24 2227  INPATIENT ADMISSION  Once                          Orders Placed This Encounter   Procedures    INPATIENT ADMISSION     Standing Status:   Standing     Number of Occurrences:   1     Order Specific Question:   Level of Care     Answer:   Med Surg [16]     Order Specific Question:   Estimated length of stay     Answer:   More than 2 Midnights     Order Specific Question:   Certification     Answer:   I certify that inpatient services are medically necessary for this patient for a duration of greater than two midnights. See H&P and MD Progress Notes for additional information about the patient's course of treatment.     ED Arrival Information       Expected   -    Arrival   10/5/2024 21:19    Acuity   Emergent              Means of arrival   Walk-In    Escorted by   Family Member    Service   Hospitalist    Admission type   Emergency              Arrival complaint   DETOX             Chief Complaint   Patient presents with    Alcohol Intoxication     Arrives requesting alcohol detox. Drinks at least a 30 pack of beer daily. No other substances. No SI/HI. Reports last drink was right before coming in. Reports hx of withdrawal shakes and \"almost seizure\" but denies having any actual seizures.        Initial Presentation: 59 y.o. male who presented to medical detox. Inpatient admission for evaluation and treatment of alcohol withdrawal syndrome. Presented w/ need for detox from alcohol. Serum ETOH: 237. Reports 30 pack of beer daily, last drink on 10/5 in am Has prior rehab treatment for withdrawal. Reports no hx of withdrawal seizures. On exam, anxiety, tremor, and nausea . SEWS 0. Plan: SEWS monitoring w/ phenobarbital management, IV thiamine/folic acid supplement, IVF, " telemetry, continuous pulse ox, continue PTA meds, trend labs, replete electrolytes as needed; I&O, fall & seizure precautions. Toxicology consulted.    Anticipated Length of Stay:  Patient will be admitted on an Inpatient basis with an anticipated length of stay of 2-3 midnights.  Justification for Hospital Stay: Alcohol withdrawal syndrome       ED Treatment-Medication Administration from 10/05/2024 2119 to 10/05/2024 2328         Date/Time Order Dose Route Action     10/05/2024 2139 sodium chloride 0.9 % bolus 1,000 mL 1,000 mL Intravenous New Bag     10/05/2024 2140 LORazepam (ATIVAN) injection 1 mg 1 mg Intravenous Given            Scheduled Medications:  enoxaparin, 40 mg, Subcutaneous, Daily  folic acid 1 mg, thiamine (VITAMIN B1) 100 mg in sodium chloride 0.9 % 100 mL IV piggyback, , Intravenous, Daily  multivitamin-minerals, 1 tablet, Oral, Daily  nicotine, 21 mg, Transdermal, Daily  pantoprazole, 40 mg, Oral, Early Morning      Continuous IV Infusions:     PRN Meds:  acetaminophen, 650 mg, Oral, Q6H PRN  ondansetron, 4 mg, Intravenous, Q6H PRN  traZODone, 50 mg, Oral, HS PRN      ED Triage Vitals   Temperature Pulse Respirations Blood Pressure SpO2 Pain Score   10/05/24 2129 10/05/24 2129 10/05/24 2129 10/05/24 2129 10/05/24 2129 10/05/24 2334   98.1 °F (36.7 °C) 77 18 159/80 97 % No Pain     Weight (last 2 days)       Date/Time Weight    10/05/24 2334 69.4 (153)    10/05/24 2129 69.5 (153.2)              Vital Signs (last 3 days)       Date/Time Temp Pulse Resp BP MAP (mmHg) SpO2 O2 Device Patient Position - Orthostatic VS Nayan Coma Scale Score CIWA-Ar Total SEWS Total Score Pain    10/07/24 0728 97.5 °F (36.4 °C) 71 18 136/78 97 95 % None (Room air) Lying -- -- -- --    10/07/24 0503 97 °F (36.1 °C) 72 17 133/71 91 97 % None (Room air) Lying -- -- -- --    10/07/24 0500 -- -- -- -- -- -- -- -- -- -- 0 --    10/06/24 2249 -- -- -- -- -- -- -- -- -- -- 2 --    10/06/24 2248 98 °F (36.7 °C) 77 18 112/61  78 98 % None (Room air) Sitting -- -- -- --    10/06/24 1916 -- -- -- -- -- -- -- -- 15 -- 6 --    10/06/24 1915 97.9 °F (36.6 °C) 84 18 140/72 94 98 % None (Room air) Sitting -- -- -- No Pain    10/06/24 1545 97.2 °F (36.2 °C) 89 18 142/85 -- 95 % None (Room air) Lying -- -- -- --    10/06/24 1534 -- -- -- -- -- -- -- -- -- -- 0 --    10/06/24 1337 -- -- -- -- -- -- -- -- -- -- 8 --    10/06/24 1145 -- -- -- -- -- -- -- -- -- -- 0 --    10/06/24 1130 97.2 °F (36.2 °C) 74 16 126/78 -- 94 % None (Room air) Lying -- -- -- --    10/06/24 1038 -- -- -- -- -- -- -- -- -- -- 0 --    10/06/24 1037 -- 76 -- -- -- 92 % None (Room air) -- -- -- -- --    10/06/24 0930 -- -- -- -- -- -- -- -- -- -- 0 --    10/06/24 0831 97.1 °F (36.2 °C) 80 18 123/77 -- 95 % None (Room air) Lying -- -- -- 2    10/06/24 0830 -- -- -- -- -- -- -- -- -- -- 7 --    10/06/24 0448 -- -- -- -- -- -- -- -- -- -- 0 --    10/06/24 0447 97.2 °F (36.2 °C) 70 16 107/70 -- 94 % None (Room air) Lying -- -- -- --    10/06/24 0310 97.6 °F (36.4 °C) 87 18 136/89 -- 93 % None (Room air) Lying -- -- -- --    10/06/24 0200 -- -- -- -- -- -- -- -- -- -- 0 --    10/05/24 2352 -- -- -- -- -- -- -- -- -- -- 11 --    10/05/24 2346 -- -- -- -- -- -- -- -- 15 -- -- --    10/05/24 2334 98.2 °F (36.8 °C) 89 18 118/80 -- 97 % None (Room air) Sitting -- -- -- No Pain    10/05/24 2312 -- 74 -- 126/64 -- 100 % None (Room air) -- -- -- -- --    10/05/24 2230 -- 88 -- 145/80 -- -- -- -- -- 0 -- --    10/05/24 2145 -- -- -- -- -- -- None (Room air) -- 15 -- -- --    10/05/24 2130 -- -- -- -- -- -- -- -- -- 10 -- --    10/05/24 2129 98.1 °F (36.7 °C) 77 18 159/80 -- 97 % None (Room air) Sitting -- -- -- --          SEWS    Row Name 10/07/24 0500 10/06/24 2249 10/06/24 1916 10/06/24 1534 10/06/24 1337   Severity of Ethanol Withdrawal Scale (SEWS)   ANXIETY: Do you feel that something bad is about to happen to you right now? 0  -DE 0  -DE 3  -DE 0  -BH 3  -BH   NAUSEA and DRY HEAVES  or VOMITING? 0  -DE 0  -DE 3  -DE 0  -BH 3  -BH   SWEATING: (includes moist palms, sweating now)? Score 0 or 2 0  -DE 2  -DE 0  -DE 0  -BH 0  -BH   TREMOR: with arms extended eyes closed? 0  -DE 0  -DE 0  -DE 0  -BH 2  -BH   AGITATION: Fidgety, restless, pacing? 0  -DE 0  -DE 0  -DE 0  -BH 0  -BH   DISORIENTATION: 0  -DE 0  -DE 0  -DE 0  -BH 0  -BH   HALLUCINATIONS: 0  -DE 0  -DE 0  -DE 0  -BH 0  -BH   VITAL SIGNS: ANY (Pulse >110, Diastolic BP >90, Temp >99.6) 0  -DE 0  -DE 0  -DE 0  -BH 0  -BH   SEWS Total Score 0  -DE 2  -DE 6  -DE 0  -BH 8  -BH   Silva Agitation Sedation Scale (RASS)   Silva Agitation Sedation Scale (RASS) -1  -DE 0  -DE 0  -DE -1  -BH 0  -BH   Row Name 10/06/24 1145 10/06/24 1038 10/06/24 0930 10/06/24 0830 10/06/24 0448   Severity of Ethanol Withdrawal Scale (SEWS)   ANXIETY: Do you feel that something bad is about to happen to you right now? 0  -BH 0  -BH 0  -BH 3  -BH 0  -TO   NAUSEA and DRY HEAVES or VOMITING? 0  -BH 0  -BH 0  -BH 0  -BH 0  -TO   SWEATING: (includes moist palms, sweating now)? Score 0 or 2 0  -BH 0  -BH 0  -BH 2  -BH 0  -TO   TREMOR: with arms extended eyes closed? 0  -BH 0  -BH 0  -BH 2  -BH 0  -TO   AGITATION: Fidgety, restless, pacing? 0  -BH 0  -BH 0  -BH 0  -BH 0  -TO   DISORIENTATION: 0  -BH 0  -BH 0  -BH 0  -BH 0  -TO   HALLUCINATIONS: 0  -BH 0  -BH 0  -BH 0  -BH 0  -TO   VITAL SIGNS: ANY (Pulse >110, Diastolic BP >90, Temp >99.6) 0  -BH 0  -BH 0  -BH 0  -BH 0  -TO   SEWS Total Score 0  -BH 0  -BH 0  -BH 7  -BH 0  -TO     Pertinent Labs/Diagnostic Test Results:   Radiology:  No orders to display     Cardiology:  No orders to display     GI:  No orders to display         Results from last 7 days   Lab Units 10/07/24  0506 10/06/24  0445 10/05/24  2136   WBC Thousand/uL 8.85 7.39 11.78*   HEMOGLOBIN g/dL 15.8 16.2 16.9   HEMATOCRIT % 43.5 44.7 48.5   PLATELETS Thousands/uL 138* 152 179   TOTAL NEUT ABS Thousands/µL 5.44  --  8.14*         Results from last 7 days    Lab Units 10/07/24  0506 10/06/24  0445 10/05/24  2136   SODIUM mmol/L 138 138 134*   POTASSIUM mmol/L 3.6 4.1 3.4*   CHLORIDE mmol/L 106 108 97   CO2 mmol/L 24 17* 26   ANION GAP mmol/L 8 13 11   BUN mg/dL 15 8 10   CREATININE mg/dL 0.76 0.63 0.82   EGFR ml/min/1.73sq m 99 107 96   CALCIUM mg/dL 8.4 8.3* 9.6   MAGNESIUM mg/dL 2.0 2.2 2.0     Results from last 7 days   Lab Units 10/07/24  0506 10/06/24  0445 10/05/24  2136   AST U/L 29 41* 44*   ALT U/L 16 15 21   ALK PHOS U/L 40 38 49   TOTAL PROTEIN g/dL 6.2* 6.7 8.3   ALBUMIN g/dL 3.6 3.8 4.6   TOTAL BILIRUBIN mg/dL 0.64 0.46 0.52         Results from last 7 days   Lab Units 10/07/24  0506 10/06/24  0445 10/05/24  2136   GLUCOSE RANDOM mg/dL 84 65 93           Results from last 7 days   Lab Units 10/05/24  2136   LIPASE u/L 41           Results from last 7 days   Lab Units 10/05/24  2136   ETHANOL LVL mg/dL 237*           Past Medical History:   Diagnosis Date    Alcoholism (HCC)     Withdrawal symptoms, alcohol (HCC)      Present on Admission:   Nicotine dependence   Transaminitis   Alcohol use disorder, severe, dependence (HCC)   GERD (gastroesophageal reflux disease)   Alcohol withdrawal syndrome with complication, with unspecified complication (HCC)   Hyponatremia      Admitting Diagnosis: Alcohol withdrawal (HCC) [F10.939]  Alcohol abuse [F10.10]  Encounter for assessment of alcohol and drug use [Z76.89]  Age/Sex: 59 y.o. male    Network Utilization Review Department  ATTENTION: Please call with any questions or concerns to [phone] and carefully listen to the prompts so that you are directed to the right person. All voicemails are confidential.   For Discharge needs, contact Care Management DC Support Team at [phone] opt. [phone]  Send all requests for admission clinical reviews, approved or denied determinations and any other requests to dedicated fax number below belonging to the campus where the patient is receiving treatment. List of dedicated fax  numbers for the Facilities:  FACILITY NAME UR FAX NUMBER   ADMISSION DENIALS (Administrative/Medical Necessity) [phone]   DISCHARGE SUPPORT TEAM (NETWORK) [phone]   PARENT CHILD HEALTH (Maternity/NICU/Pediatrics) [phone]   [facility] [phone]   [facility] [phone]   [facility] [phone]   [facility] [phone]   [facility] [phone]   [facility] [phone]   General acute hospital [phone]   [facility] [phone]   [facility] [phone]   [facility] [phone]   [facility] [phone]   [facility] [phone]

## 2024-10-07 NOTE — CERTIFIED RECOVERY SPECIALIST
"   Certified  Note    Patient name: Steve Dunham  Location: 5T DETOX 514/5T DETOX 51*  [place]: [facility]  Attending:  Reese Tineo MD MRN [id number]  : 1965  Age: 59 y.o.    Sex: male Date 10/7/2024         Substance Use History:     Social History     Substance and Sexual Activity   Alcohol Use Yes    Comment: 30 pack of beer minimum daily        Social History     Substance and Sexual Activity   Drug Use Not Currently    Types: Cocaine       Time spent: 30 minutes    Consult Rec'd: Y  Patient seen in: [facility]  Stage of change: Contemplation    Substance used: Alcohol / beer  Frequency/Quantity: Daily / case  Date of last use: 10/5/2024    CRS met with patient to explain services and offer recovery support. Patient shared that he had 4 yrs sober and attended AA, was connected and slowly became disconnected. Patient said \"this time was the worst and I did / said things I don't even remember\" Patient seems remorseful and indicated he may want to go to  treatment again.   CRS and patient discuss options for treatment, prison homes and recovery and he is considering these options.    CRS provided business card and resources.                             '          Kristine Hadley       "

## 2024-10-07 NOTE — PROGRESS NOTES
"Progress Note - Medical Toxicology   Name: Steve Dunham 59 y.o. male I MRN: [id number]  Unit/Bed#: 5T DETOX 514-01 I Date of Admission: 10/5/2024   Date of Service: 10/7/2024 I Hospital Day: 2    Assessment & Plan  Alcohol withdrawal syndrome with complication, with unspecified complication (HCC)  Patient with a history of chronic daily alcohol use  Last drink 10/5/2024 in AM  Serum alcohol 237 in the ED  Received Ativan 2 mg IV, 1000 ml NS in the ED PTA  Discontinue SEWS protocol for medical management of alcohol withdrawal as objective and subjective findings of withdrawal have improved  SEWS score 11 upon admission  Received 260 mg of phenobarbital as initial dose- Received total of 650 mg thus far    SEWS Total Score: 0 (10/7/2024  8:00 AM)     Alcohol use disorder, severe, dependence (HCC)  Continue daily thiamine, folate, and multivitamin supplementation    Patient states naltrexone causes \"brain vibrations\"; not interested in acamprosate    Recommend CRS consultation for recovery support    Recommend case management consultation for disposition planning; patient would like to re-connect with AA upon discharge  Nicotine dependence  Currently smokes 1 ppd; recommend nicotine replacement therapy (21 mg patch)  Hyponatremia  Recent Labs     10/05/24  2136 10/06/24  0445 10/07/24  0506   SODIUM 134* 138 138   CL 97 108 106   Likely due to dehydration versus alcohol potomania; SIADH less likely  Continue IVF hydration, encourage p.o. intake  Monitor a.m. labs for improvement; primary care team to f/u and monitor    Transaminitis  Recent Labs     10/05/24  2136 10/06/24  0445 10/07/24  0506   AST 44* 41* 29   ALT 21 15 16   ALKPHOS 49 38 40   In setting of acute alcohol consumption/withdrawal  Denies any abdominal pain, skin discoloration, stool color changes or excessive weight loss  Alcohol cessation counseling provided  Stable after IV fluid hydration; primary care team to f/u and monitor    GERD " (gastroesophageal reflux disease)  Continue PTA med: protonix 40 mg daily  Hypokalemia  Recent Labs     10/05/24  2136 10/06/24  0445 10/07/24  0506   K 3.4* 4.1 3.6   Stable after repletion by primary team      Medical toxicology will sign off at this time.  Please reach out via secure chat with any questions or concerns.    Reason for current consult: ETOH withdrawal syndrome/ETOH use disorder     Subjective   Patient seen and evaluated at bedside.  Patient reports that he feels well today.  He states that he feels a little sweaty today but overall feeling better.  He denies chest pain, shortness of breath, nausea, vomiting, anxiety, auditory or visual hallucinations.  He states that he is worried about going back home to his family's house.  He denies suicidal ideation, intent, or plan.  He reports that he would be interested in some kind of recovery house.    Objective :  Temp:  [97 °F (36.1 °C)-98 °F (36.7 °C)] 97.5 °F (36.4 °C)  HR:  [71-89] 71  BP: (112-142)/(61-85) 136/78  Resp:  [16-18] 18  SpO2:  [92 %-98 %] 95 %  O2 Device: None (Room air)    No intake or output data in the 24 hours ending 10/07/24 0857    Physical Exam  Vitals and nursing note reviewed.   Constitutional:       General: He is not in acute distress.     Appearance: He is normal weight.   HENT:      Head: Normocephalic and atraumatic.      Mouth/Throat:      Mouth: Mucous membranes are moist.      Pharynx: Oropharynx is clear.   Eyes:      General: No scleral icterus.     Conjunctiva/sclera: Conjunctivae normal.      Pupils: Pupils are equal, round, and reactive to light.   Cardiovascular:      Rate and Rhythm: Normal rate and regular rhythm.      Heart sounds: No murmur heard.  Pulmonary:      Effort: Pulmonary effort is normal. No respiratory distress.      Breath sounds: Normal breath sounds.   Abdominal:      General: There is no distension.      Palpations: Abdomen is soft.      Tenderness: There is no abdominal tenderness.    Musculoskeletal:         General: No swelling.   Skin:     General: Skin is warm and dry.      Coloration: Skin is not jaundiced.   Neurological:      General: No focal deficit present.      Mental Status: He is alert and oriented to person, place, and time.      Comments: No hand tremors   No tongue fasciculations  Normal finger-to-nose   Psychiatric:         Mood and Affect: Mood normal.           Lab Results: I have reviewed the following results:CBC/BMP:   .     10/07/24  0506   WBC 8.85   HGB 15.8   HCT 43.5   *   SODIUM 138   K 3.6      CO2 24   BUN 15   CREATININE 0.76   GLUC 84   MG 2.0    , LFTs:   .     10/07/24  0506   AST 29   ALT 16   ALB 3.6   TBILI 0.64   ALKPHOS 40        Imaging Results Review: No pertinent imaging studies reviewed.  Other Study Results Review: EKG was reviewed.     Richa Laguna DO  Psychiatry Resident, PGY-2

## 2024-10-08 VITALS
SYSTOLIC BLOOD PRESSURE: 125 MMHG | DIASTOLIC BLOOD PRESSURE: 71 MMHG | HEIGHT: 69 IN | TEMPERATURE: 98 F | OXYGEN SATURATION: 98 % | RESPIRATION RATE: 18 BRPM | WEIGHT: 153 LBS | BODY MASS INDEX: 22.66 KG/M2 | HEART RATE: 68 BPM

## 2024-10-08 PROCEDURE — 99239 HOSP IP/OBS DSCHRG MGMT >30: CPT | Performed by: INTERNAL MEDICINE

## 2024-10-08 RX ORDER — PANTOPRAZOLE SODIUM 40 MG/1
40 TABLET, DELAYED RELEASE ORAL
Qty: 15 TABLET | Refills: 0 | Status: SHIPPED | OUTPATIENT
Start: 2024-10-09

## 2024-10-08 RX ORDER — FOLIC ACID 1 MG/1
1 TABLET ORAL DAILY
Qty: 15 TABLET | Refills: 0 | Status: SHIPPED | OUTPATIENT
Start: 2024-10-08 | End: 2024-11-07

## 2024-10-08 RX ORDER — TRAZODONE HYDROCHLORIDE 50 MG/1
50 TABLET, FILM COATED ORAL
Qty: 15 TABLET | Refills: 0 | Status: SHIPPED | OUTPATIENT
Start: 2024-10-08

## 2024-10-08 RX ORDER — NICOTINE 21 MG/24HR
1 PATCH, TRANSDERMAL 24 HOURS TRANSDERMAL DAILY
Qty: 30 PATCH | Refills: 0 | Status: SHIPPED | OUTPATIENT
Start: 2024-10-08 | End: 2024-11-07

## 2024-10-08 RX ORDER — ACETAMINOPHEN 325 MG/1
650 TABLET ORAL EVERY 6 HOURS PRN
Qty: 30 TABLET | Refills: 0 | Status: SHIPPED | OUTPATIENT
Start: 2024-10-08

## 2024-10-08 RX ADMIN — MULTIPLE VITAMINS W/ MINERALS TAB 1 TABLET: TAB ORAL at 09:33

## 2024-10-08 RX ADMIN — NICOTINE 21 MG: 21 PATCH, EXTENDED RELEASE TRANSDERMAL at 09:33

## 2024-10-08 RX ADMIN — PANTOPRAZOLE SODIUM 40 MG: 40 TABLET, DELAYED RELEASE ORAL at 06:12

## 2024-10-08 NOTE — DISCHARGE INSTR - OTHER ORDERS
Drug and Alcohol Resources in [facility]    If you have health insurance, including medical assistance, there should be a phone number on your insurance card that you can call to find out how to access services. The card may say, “For Behavioral Health Services” or “For Drug and Alcohol Services” or “For Substance Abuse Services” call the number provided. Or contact [phone]    [facility] ([facility])  The [facility] provides care management for adults with Opioid Use Disorder. The [facility] has a team of care managers who will help individuals get into treatment, coordinate behavioral and physical health care, and provide recovery support and guidance. Care managers will also provide information about Medication-Assisted Treatment. Contact [phone]    [facility] Drug and Alcohol Administration [phone] For additional information, contact the Department of Human Services Information and Referral Unit at [phone] between the hours of 8:30 a.m. and 4:30 p.m., Monday through Friday.    Recovery Centers  These centers offer a safe, sober environment to those in recovery. A variety of programming including 12-Step Meetings, Yoga, Karaoke, Life Skills Workshops, etc. is offered at each location.   [facility] [address] [place] [address] [phone]   [place] [place] [address] [phone] [email or web address]    [facility] National Helpline  Confidential free help, from public health agencies, to find substance use treatment and information. [phone]    Link for Zoom Codes for Virtual 12 step Meetings https://www.[facility].org/HS/DRGALC/Pages/default.aspx    [facility] Phone Hotline  If you feel you have a problem with alcohol, or if you simply want to know more about AA, call our 24-hour hotline at: [phone]     AA Meetings can be found at https://www.aalv.org/meetings  NA Meeting list  https://[email or web address].org/meeting/Little%20Apple%20update12_19.pdf    Local Outpatient Providers:   [facility] - provides intensive outpatient and outpatient treatment services to adolescents, adults and families experiencing drug and/or alcohol problems. Treatment modalities include individual, group and family counseling. Weekend DUI classes are available. [facility] is a division of [employer]   [address] [place] [address]   Phone: [phone]   Fax: [phone]   9:00 a.m. to 10:00 p.m. (Mon-Thurs)   9:00 a.m. to 5:00 p.m. (Fri)   [facility] ([facility])- specializes in opiate addiction treatment (methadone) and outpatient treatment services.   [facility], [address]  [place] [address]  Phone: [phone]  Fax: [phone]   6:00 a.m. to 2:00 p.m. (Mon - Fri)   6:00 a.m. to 9:00 a.m. (Sat)   [facility] ([facility]) - provides intensive outpatient treatment and outpatient treatment to adults and adolescents for drug and/or alcohol abuse and gambling addiction.   [address] [place] [address]  Phone: [phone]  Fax: [phone]   10:00 a.m. to 8:00 p.m. (Mon - Fri)   [facility] ([facility]) - provides a full continuum of community-based outpatient services to treat behavioral problems associated with alcohol and drug addiction. Offering a variety of specialized client services, [facility] provides a high quality substance abuse treatment and co-occurring services to meet the specific needs of the individual. It is the outpatient affiliate of [facility].   [address], [address]  [place] [address]  Phone: [phone]   Fax: [phone]   9:00 a.m. to 9:00 p.m. (Mon - Thurs)   9:00 a.m. to 5:00 p.m. (Fri)   [facility] [place] - provides intensive outpatient and outpatient treatment services to adolescents, adults  and families.   [facility]  [address]   [place] [address]  Phone: [phone]  Fax: [phone]   9:00 a.m. to 5:00 p.m. (Mondays)   9:00 a.m. to 2:00 p.m. (Tues-Fri)   [facility] - provides treatment to adults with substance abuse and mental illness. The program provides individual, group and family therapy and psychiatric services.   [address], [address]  [place] [address]   Phone: [phone]   Fax: [phone]   8:00 a.m. to 4:30 p.m. (Mon, Tues, Thurs)   11:00 a.m. to 7:00 p.m. (Wed and Fri)

## 2024-10-08 NOTE — PROGRESS NOTES
CM met with Pt to review disposition plan.  Pt reports he has spoken to family and is not able to return home until he has completed IP tx.    Pt in agreement with having [facility] review his clinicals for admission.  ANDREIA signed for [facility] due to Pt needing [facility] funding.  CM spoke to Chelle Olson, [facility], whom verbalizes that as long as a packet is submitted and Pt lives in [facility] and meets income limits , then he will be funded.  Same information conveyed to [facility].

## 2024-10-08 NOTE — PROGRESS NOTES
10/08/24 1440   Other Referral/Resources/Interventions Provided:   Financial Resources Provided Indigent Medication  (Indigent medication covered in the amount of $26.97)   Referrals Provided: Crisis Hotline   Post Acute Placement to: Substance Abuse Treatment  (Pt directly admissted to [facility] for LISSETTE Tx.)   Discharge Communications   Discharge planning discussed with: Pt.   Freedom of Choice Yes   Were Treatment Team discharge recommendations reviewed with patient/caregiver? Yes   Did patient/caregiver verbalize understanding of patient care needs? Yes   Were patient/caregiver advised of the risks associated with not following Treatment Team discharge recommendations? Yes   Contacts   Patient Contacts Pt.  Pt decnies presently expreiencing any symptoms of withdrawal.   Relationship to Patient:   (Pt has a cellular phone and calls family directly to provide updates.)   Reason/Outcome Discharge Planning   [facility]   Would you like to participate in our [facility] service program?   No - Declined

## 2024-10-08 NOTE — NURSING NOTE
Patient asleep at this time. Heart rate, respiratory rate and SPO2 are within normal limits. SEWS score deferred at this time.  
Patient discharged to [facility]. IV removed, belongings accounted for. AVS reviewed with patient, questions answered and understanding stated. Patient ambulated to lobby with  for transportation to rehab facility.  
[FreeTextEntry1] : LEV 2/5/20: B GSV incomp, multiple incomp varicose veins\par BENITO 1/20: No PVD

## 2024-10-08 NOTE — DISCHARGE SUMMARY
Discharge Summary - Steve Dunham, 1965, [id number]        Admission Date: 10/5/2024  Discharge Date: 10/8/2024      Discharge Diagnosis:   1.  Alcohol withdrawal syndrome  2.  Alcohol use disorder, severe  3.  Hyponatremia secondary to #2  4.  Nicotine dependence  5.  Hypokalemia  6.  Esophageal reflux    Consulting Physicians:  Dr. Cassidy, toxicology    Procedures Performed:   None    HPI: The patient is a 59-year-old man who has been drinking 30 beers a day for the past 8 months.  He presented to the emergency room requesting alcohol detox.  In the emergency room he was experiencing diaphoresis, nausea, restlessness, and anxiety.  He was previously treated with naltrexone but stopped because of side effects.  He was admitted for further care.    Hospital Course: The patient was admitted to the detox unit and monitored carefully.  He was hydrated with intravenous fluid.  He was noted to have hyponatremia and hypokalemia at the time of admission which resolved with IV fluid administration.    The patient was started on the SEWS protocol.  He was treated with phenobarbital.  With these measures his alcohol withdrawal symptoms improved.  He was successfully weaned off of the protocol without recrudescence of his symptoms.    The patient has a history of heavy cigarette smoking.  He was started on nicotine patch during his stay.    At the time of his discharge she was feeling well.  Vital signs were stable.  Lungs were clear.  Cardiac exam revealed a regular rhythm.  The abdomen was soft and nontender.  There was no edema.  Neurologic evaluation revealed the patient to be alert and oriented.  He was calm.  No focal sign was present.    Disposition: The patient was discharged to the [facility] on October 8.  Diet and activity will be as tolerated.  He will be under the care of the physicians at the [facility] during his stay there.  He was asked to arrange primary care follow-up promptly upon his  discharge.    Discharge instructions/Information to patient and family:   See after visit summary for information provided to patient and family.      Provisions for Follow-Up Care:  See after visit summary for information related to follow-up care and any pertinent home health orders.      Planned Readmission: No    Discharge Statement   I spent 40 minutes discharging the patient. This time was spent on the day of discharge. I had direct contact with the patient on the day of discharge.     Discharge Medications:  See after visit summary for reconciled discharge medications provided to patient and family.

## 2025-07-29 ENCOUNTER — APPOINTMENT (EMERGENCY)
Dept: CT IMAGING | Facility: HOSPITAL | Age: 60
End: 2025-07-29
Payer: COMMERCIAL

## 2025-07-29 ENCOUNTER — HOSPITAL ENCOUNTER (EMERGENCY)
Facility: HOSPITAL | Age: 60
End: 2025-07-30
Attending: EMERGENCY MEDICINE | Admitting: EMERGENCY MEDICINE
Payer: COMMERCIAL

## 2025-07-29 DIAGNOSIS — R10.9 ABDOMINAL PAIN: ICD-10-CM

## 2025-07-29 DIAGNOSIS — R79.89 ELEVATED LFTS: ICD-10-CM

## 2025-07-29 DIAGNOSIS — F10.929 ALCOHOL INTOXICATION (HCC): Primary | ICD-10-CM

## 2025-07-29 DIAGNOSIS — F10.939 ALCOHOL WITHDRAWAL (HCC): ICD-10-CM

## 2025-07-29 DIAGNOSIS — D69.6 THROMBOCYTOPENIA (HCC): ICD-10-CM

## 2025-07-29 LAB
ALBUMIN SERPL BCG-MCNC: 4.5 G/DL (ref 3.5–5)
ALP SERPL-CCNC: 49 U/L (ref 34–104)
ALT SERPL W P-5'-P-CCNC: 82 U/L (ref 7–52)
ANION GAP SERPL CALCULATED.3IONS-SCNC: 11 MMOL/L (ref 4–13)
APAP SERPL-MCNC: <2 UG/ML (ref 10–20)
APTT PPP: 25 SECONDS (ref 23–34)
AST SERPL W P-5'-P-CCNC: 121 U/L (ref 13–39)
BASOPHILS # BLD AUTO: 0.07 THOUSANDS/ÂΜL (ref 0–0.1)
BASOPHILS NFR BLD AUTO: 2 % (ref 0–1)
BILIRUB SERPL-MCNC: 0.47 MG/DL (ref 0.2–1)
BUN SERPL-MCNC: 6 MG/DL (ref 5–25)
CALCIUM SERPL-MCNC: 8.7 MG/DL (ref 8.4–10.2)
CHLORIDE SERPL-SCNC: 103 MMOL/L (ref 96–108)
CO2 SERPL-SCNC: 22 MMOL/L (ref 21–32)
CREAT SERPL-MCNC: 0.67 MG/DL (ref 0.6–1.3)
EOSINOPHIL # BLD AUTO: 0.06 THOUSAND/ÂΜL (ref 0–0.61)
EOSINOPHIL NFR BLD AUTO: 1 % (ref 0–6)
ERYTHROCYTE [DISTWIDTH] IN BLOOD BY AUTOMATED COUNT: 14.2 % (ref 11.6–15.1)
ETHANOL SERPL-MCNC: 442 MG/DL
GFR SERPL CREATININE-BSD FRML MDRD: 104 ML/MIN/1.73SQ M
GLUCOSE SERPL-MCNC: 103 MG/DL (ref 65–140)
HCT VFR BLD AUTO: 44.8 % (ref 36.5–49.3)
HGB BLD-MCNC: 15.6 G/DL (ref 12–17)
IMM GRANULOCYTES # BLD AUTO: 0.02 THOUSAND/UL (ref 0–0.2)
IMM GRANULOCYTES NFR BLD AUTO: 1 % (ref 0–2)
INR PPP: 0.91 (ref 0.85–1.19)
LIPASE SERPL-CCNC: 46 U/L (ref 11–82)
LYMPHOCYTES # BLD AUTO: 1.41 THOUSANDS/ÂΜL (ref 0.6–4.47)
LYMPHOCYTES NFR BLD AUTO: 32 % (ref 14–44)
MAGNESIUM SERPL-MCNC: 2 MG/DL (ref 1.9–2.7)
MCH RBC QN AUTO: 33.4 PG (ref 26.8–34.3)
MCHC RBC AUTO-ENTMCNC: 34.8 G/DL (ref 31.4–37.4)
MCV RBC AUTO: 96 FL (ref 82–98)
MONOCYTES # BLD AUTO: 0.56 THOUSAND/ÂΜL (ref 0.17–1.22)
MONOCYTES NFR BLD AUTO: 13 % (ref 4–12)
NEUTROPHILS # BLD AUTO: 2.25 THOUSANDS/ÂΜL (ref 1.85–7.62)
NEUTS SEG NFR BLD AUTO: 51 % (ref 43–75)
NRBC BLD AUTO-RTO: 0 /100 WBCS
PHOSPHATE SERPL-MCNC: 4.3 MG/DL (ref 2.3–4.1)
PLATELET # BLD AUTO: 96 THOUSANDS/UL (ref 149–390)
PMV BLD AUTO: 10.7 FL (ref 8.9–12.7)
POTASSIUM SERPL-SCNC: 3.8 MMOL/L (ref 3.5–5.3)
PROT SERPL-MCNC: 7.4 G/DL (ref 6.4–8.4)
PROTHROMBIN TIME: 12.5 SECONDS (ref 12.3–15)
RBC # BLD AUTO: 4.67 MILLION/UL (ref 3.88–5.62)
SALICYLATES SERPL-MCNC: <5 MG/DL (ref 5–20)
SODIUM SERPL-SCNC: 136 MMOL/L (ref 135–147)
WBC # BLD AUTO: 4.37 THOUSAND/UL (ref 4.31–10.16)

## 2025-07-29 PROCEDURE — 99285 EMERGENCY DEPT VISIT HI MDM: CPT

## 2025-07-29 PROCEDURE — 83735 ASSAY OF MAGNESIUM: CPT | Performed by: EMERGENCY MEDICINE

## 2025-07-29 PROCEDURE — 85610 PROTHROMBIN TIME: CPT | Performed by: EMERGENCY MEDICINE

## 2025-07-29 PROCEDURE — 96375 TX/PRO/DX INJ NEW DRUG ADDON: CPT

## 2025-07-29 PROCEDURE — 80053 COMPREHEN METABOLIC PANEL: CPT | Performed by: EMERGENCY MEDICINE

## 2025-07-29 PROCEDURE — 85025 COMPLETE CBC W/AUTO DIFF WBC: CPT | Performed by: EMERGENCY MEDICINE

## 2025-07-29 PROCEDURE — 85730 THROMBOPLASTIN TIME PARTIAL: CPT | Performed by: EMERGENCY MEDICINE

## 2025-07-29 PROCEDURE — 96361 HYDRATE IV INFUSION ADD-ON: CPT

## 2025-07-29 PROCEDURE — 93005 ELECTROCARDIOGRAM TRACING: CPT

## 2025-07-29 PROCEDURE — 74177 CT ABD & PELVIS W/CONTRAST: CPT

## 2025-07-29 PROCEDURE — 99285 EMERGENCY DEPT VISIT HI MDM: CPT | Performed by: EMERGENCY MEDICINE

## 2025-07-29 PROCEDURE — 83690 ASSAY OF LIPASE: CPT | Performed by: EMERGENCY MEDICINE

## 2025-07-29 PROCEDURE — 80179 DRUG ASSAY SALICYLATE: CPT | Performed by: EMERGENCY MEDICINE

## 2025-07-29 PROCEDURE — 71260 CT THORAX DX C+: CPT

## 2025-07-29 PROCEDURE — 82077 ASSAY SPEC XCP UR&BREATH IA: CPT | Performed by: EMERGENCY MEDICINE

## 2025-07-29 PROCEDURE — 36415 COLL VENOUS BLD VENIPUNCTURE: CPT | Performed by: EMERGENCY MEDICINE

## 2025-07-29 PROCEDURE — 84100 ASSAY OF PHOSPHORUS: CPT | Performed by: EMERGENCY MEDICINE

## 2025-07-29 PROCEDURE — 80143 DRUG ASSAY ACETAMINOPHEN: CPT | Performed by: EMERGENCY MEDICINE

## 2025-07-29 RX ORDER — LORAZEPAM 2 MG/ML
2 INJECTION INTRAMUSCULAR EVERY 6 HOURS PRN
Status: DISCONTINUED | OUTPATIENT
Start: 2025-07-29 | End: 2025-07-30 | Stop reason: HOSPADM

## 2025-07-29 RX ORDER — FOLIC ACID 1 MG/1
1 TABLET ORAL DAILY
Status: DISCONTINUED | OUTPATIENT
Start: 2025-07-29 | End: 2025-07-30 | Stop reason: HOSPADM

## 2025-07-29 RX ORDER — LANOLIN ALCOHOL/MO/W.PET/CERES
100 CREAM (GRAM) TOPICAL DAILY
Status: DISCONTINUED | OUTPATIENT
Start: 2025-07-29 | End: 2025-07-30 | Stop reason: HOSPADM

## 2025-07-29 RX ADMIN — Medication 1 TABLET: at 22:34

## 2025-07-29 RX ADMIN — THIAMINE HCL TAB 100 MG 100 MG: 100 TAB at 22:34

## 2025-07-29 RX ADMIN — LORAZEPAM 2 MG: 2 INJECTION INTRAMUSCULAR; INTRAVENOUS at 22:41

## 2025-07-29 RX ADMIN — SODIUM CHLORIDE 1000 ML: 0.9 INJECTION, SOLUTION INTRAVENOUS at 22:46

## 2025-07-29 RX ADMIN — FOLIC ACID 1 MG: 1 TABLET ORAL at 22:34

## 2025-07-29 RX ADMIN — IOHEXOL 100 ML: 350 INJECTION, SOLUTION INTRAVENOUS at 23:35

## 2025-07-30 ENCOUNTER — HOSPITAL ENCOUNTER (INPATIENT)
Facility: HOSPITAL | Age: 60
LOS: 1 days | Discharge: HOME | End: 2025-08-01
Attending: INTERNAL MEDICINE | Admitting: INTERNAL MEDICINE
Payer: COMMERCIAL

## 2025-07-30 VITALS
SYSTOLIC BLOOD PRESSURE: 109 MMHG | OXYGEN SATURATION: 96 % | HEART RATE: 80 BPM | WEIGHT: 172.62 LBS | DIASTOLIC BLOOD PRESSURE: 64 MMHG | BODY MASS INDEX: 25.57 KG/M2 | TEMPERATURE: 98.4 F | HEIGHT: 69 IN | RESPIRATION RATE: 16 BRPM

## 2025-07-30 PROBLEM — F10.929 ACUTE ALCOHOLIC INTOXICATION WITH COMPLICATION (HCC): Status: ACTIVE | Noted: 2025-07-30

## 2025-07-30 PROBLEM — B18.2 CHRONIC HEPATITIS C WITHOUT HEPATIC COMA (HCC): Status: ACTIVE | Noted: 2019-02-21

## 2025-07-30 PROBLEM — D69.59 THROMBOCYTOPENIA, SECONDARY: Status: ACTIVE | Noted: 2019-02-21

## 2025-07-30 LAB
AMPHETAMINES SERPL QL SCN: NEGATIVE
ATRIAL RATE: 71 BPM
BARBITURATES UR QL: NEGATIVE
BENZODIAZ UR QL: NEGATIVE
BILIRUB UR QL STRIP: NEGATIVE
CLARITY UR: CLEAR
COCAINE UR QL: NEGATIVE
COLOR UR: COLORLESS
FENTANYL UR QL SCN: NEGATIVE
GLUCOSE UR STRIP-MCNC: NEGATIVE MG/DL
HGB UR QL STRIP.AUTO: NEGATIVE
HYDROCODONE UR QL SCN: NEGATIVE
KETONES UR STRIP-MCNC: NEGATIVE MG/DL
LEUKOCYTE ESTERASE UR QL STRIP: NEGATIVE
METHADONE UR QL: NEGATIVE
NITRITE UR QL STRIP: NEGATIVE
OPIATES UR QL SCN: NEGATIVE
OXYCODONE+OXYMORPHONE UR QL SCN: NEGATIVE
P AXIS: 70 DEGREES
PCP UR QL: NEGATIVE
PH UR STRIP.AUTO: 6 [PH]
PR INTERVAL: 154 MS
PROT UR STRIP-MCNC: NEGATIVE MG/DL
QRS AXIS: 101 DEGREES
QRSD INTERVAL: 94 MS
QT INTERVAL: 398 MS
QTC INTERVAL: 432 MS
SP GR UR STRIP.AUTO: 1.01 (ref 1–1.03)
T WAVE AXIS: 64 DEGREES
THC UR QL: NEGATIVE
UROBILINOGEN UR STRIP-ACNC: <2 MG/DL
VENTRICULAR RATE: 71 BPM

## 2025-07-30 PROCEDURE — 81003 URINALYSIS AUTO W/O SCOPE: CPT | Performed by: EMERGENCY MEDICINE

## 2025-07-30 PROCEDURE — 80307 DRUG TEST PRSMV CHEM ANLYZR: CPT | Performed by: EMERGENCY MEDICINE

## 2025-07-30 PROCEDURE — 96365 THER/PROPH/DIAG IV INF INIT: CPT

## 2025-07-30 PROCEDURE — 93010 ELECTROCARDIOGRAM REPORT: CPT | Performed by: INTERNAL MEDICINE

## 2025-07-30 RX ADMIN — PHENOBARBITAL SODIUM 650 MG: 130 INJECTION INTRAMUSCULAR; INTRAVENOUS at 00:54

## 2025-07-31 PROBLEM — F10.929 ACUTE ALCOHOLIC INTOXICATION WITH COMPLICATION (HCC): Status: RESOLVED | Noted: 2025-07-30 | Resolved: 2025-07-31

## 2025-08-01 PROBLEM — E87.6 HYPOKALEMIA: Status: RESOLVED | Noted: 2024-10-05 | Resolved: 2025-08-01
